# Patient Record
Sex: FEMALE | Race: WHITE | Employment: OTHER | URBAN - METROPOLITAN AREA
[De-identification: names, ages, dates, MRNs, and addresses within clinical notes are randomized per-mention and may not be internally consistent; named-entity substitution may affect disease eponyms.]

---

## 2019-02-13 ENCOUNTER — APPOINTMENT (OUTPATIENT)
Dept: CT IMAGING | Age: 79
DRG: 390 | End: 2019-02-13
Attending: EMERGENCY MEDICINE
Payer: MEDICARE

## 2019-02-13 ENCOUNTER — HOSPITAL ENCOUNTER (INPATIENT)
Age: 79
LOS: 3 days | Discharge: HOME | DRG: 390 | End: 2019-02-16
Attending: EMERGENCY MEDICINE | Admitting: SURGERY
Payer: MEDICARE

## 2019-02-13 DIAGNOSIS — K56.609 SBO (SMALL BOWEL OBSTRUCTION) (HCC): Primary | ICD-10-CM

## 2019-02-13 LAB
ALBUMIN SERPL-MCNC: 4.2 G/DL (ref 3.5–5)
ALBUMIN/GLOB SERPL: 1.3 {RATIO} (ref 1.1–2.2)
ALP SERPL-CCNC: 65 U/L (ref 45–117)
ALT SERPL-CCNC: 33 U/L (ref 12–78)
ANION GAP SERPL CALC-SCNC: 8 MMOL/L (ref 5–15)
APPEARANCE UR: CLEAR
AST SERPL-CCNC: 23 U/L (ref 15–37)
BACTERIA URNS QL MICRO: ABNORMAL /HPF
BASOPHILS # BLD: 0 K/UL (ref 0–0.1)
BASOPHILS NFR BLD: 0 % (ref 0–1)
BILIRUB SERPL-MCNC: 0.6 MG/DL (ref 0.2–1)
BILIRUB UR QL: NEGATIVE
BUN SERPL-MCNC: 22 MG/DL (ref 6–20)
BUN/CREAT SERPL: 19 (ref 12–20)
CALCIUM SERPL-MCNC: 9.1 MG/DL (ref 8.5–10.1)
CHLORIDE SERPL-SCNC: 102 MMOL/L (ref 97–108)
CO2 SERPL-SCNC: 29 MMOL/L (ref 21–32)
COLOR UR: ABNORMAL
COMMENT, HOLDF: NORMAL
CREAT SERPL-MCNC: 1.18 MG/DL (ref 0.55–1.02)
DIFFERENTIAL METHOD BLD: NORMAL
EOSINOPHIL # BLD: 0.2 K/UL (ref 0–0.4)
EOSINOPHIL NFR BLD: 2 % (ref 0–7)
EPITH CASTS URNS QL MICRO: ABNORMAL /LPF
ERYTHROCYTE [DISTWIDTH] IN BLOOD BY AUTOMATED COUNT: 13.2 % (ref 11.5–14.5)
GLOBULIN SER CALC-MCNC: 3.3 G/DL (ref 2–4)
GLUCOSE SERPL-MCNC: 115 MG/DL (ref 65–100)
GLUCOSE UR STRIP.AUTO-MCNC: NEGATIVE MG/DL
HCT VFR BLD AUTO: 45.1 % (ref 35–47)
HGB BLD-MCNC: 14.8 G/DL (ref 11.5–16)
HGB UR QL STRIP: NEGATIVE
KETONES UR QL STRIP.AUTO: NEGATIVE MG/DL
LEUKOCYTE ESTERASE UR QL STRIP.AUTO: NEGATIVE
LIPASE SERPL-CCNC: 133 U/L (ref 73–393)
LYMPHOCYTES # BLD: 2.4 K/UL (ref 0.8–3.5)
LYMPHOCYTES NFR BLD: 22 % (ref 12–49)
MCH RBC QN AUTO: 29.4 PG (ref 26–34)
MCHC RBC AUTO-ENTMCNC: 32.8 G/DL (ref 30–36.5)
MCV RBC AUTO: 89.7 FL (ref 80–99)
MONOCYTES # BLD: 0.8 K/UL (ref 0–1)
MONOCYTES NFR BLD: 7 % (ref 5–13)
NEUTS SEG # BLD: 7.4 K/UL (ref 1.8–8)
NEUTS SEG NFR BLD: 69 % (ref 32–75)
NITRITE UR QL STRIP.AUTO: NEGATIVE
PH UR STRIP: 6.5 [PH] (ref 5–8)
PLATELET # BLD AUTO: 220 K/UL (ref 150–400)
PMV BLD AUTO: 9.9 FL (ref 8.9–12.9)
POTASSIUM SERPL-SCNC: 4.1 MMOL/L (ref 3.5–5.1)
PROT SERPL-MCNC: 7.5 G/DL (ref 6.4–8.2)
PROT UR STRIP-MCNC: NEGATIVE MG/DL
RBC # BLD AUTO: 5.03 M/UL (ref 3.8–5.2)
RBC #/AREA URNS HPF: ABNORMAL /HPF (ref 0–5)
SAMPLES BEING HELD,HOLD: NORMAL
SODIUM SERPL-SCNC: 139 MMOL/L (ref 136–145)
SP GR UR REFRACTOMETRY: 1.02 (ref 1–1.03)
UA: UC IF INDICATED,UAUC: ABNORMAL
UROBILINOGEN UR QL STRIP.AUTO: 0.2 EU/DL (ref 0.2–1)
WBC # BLD AUTO: 10.8 K/UL (ref 3.6–11)
WBC URNS QL MICRO: ABNORMAL /HPF (ref 0–4)
XXWBCSUS: 0

## 2019-02-13 PROCEDURE — 74177 CT ABD & PELVIS W/CONTRAST: CPT

## 2019-02-13 PROCEDURE — 74011250636 HC RX REV CODE- 250/636: Performed by: EMERGENCY MEDICINE

## 2019-02-13 PROCEDURE — 81001 URINALYSIS AUTO W/SCOPE: CPT

## 2019-02-13 PROCEDURE — 96361 HYDRATE IV INFUSION ADD-ON: CPT

## 2019-02-13 PROCEDURE — 83690 ASSAY OF LIPASE: CPT

## 2019-02-13 PROCEDURE — 74011000258 HC RX REV CODE- 258: Performed by: SURGERY

## 2019-02-13 PROCEDURE — 74011250636 HC RX REV CODE- 250/636: Performed by: SURGERY

## 2019-02-13 PROCEDURE — 65270000029 HC RM PRIVATE

## 2019-02-13 PROCEDURE — 99285 EMERGENCY DEPT VISIT HI MDM: CPT

## 2019-02-13 PROCEDURE — 74011250637 HC RX REV CODE- 250/637: Performed by: SURGERY

## 2019-02-13 PROCEDURE — 96360 HYDRATION IV INFUSION INIT: CPT

## 2019-02-13 PROCEDURE — 74011636320 HC RX REV CODE- 636/320: Performed by: EMERGENCY MEDICINE

## 2019-02-13 PROCEDURE — 87086 URINE CULTURE/COLONY COUNT: CPT

## 2019-02-13 PROCEDURE — 36415 COLL VENOUS BLD VENIPUNCTURE: CPT

## 2019-02-13 PROCEDURE — 85025 COMPLETE CBC W/AUTO DIFF WBC: CPT

## 2019-02-13 PROCEDURE — 80053 COMPREHEN METABOLIC PANEL: CPT

## 2019-02-13 RX ORDER — SODIUM CHLORIDE, SODIUM LACTATE, POTASSIUM CHLORIDE, CALCIUM CHLORIDE 600; 310; 30; 20 MG/100ML; MG/100ML; MG/100ML; MG/100ML
125 INJECTION, SOLUTION INTRAVENOUS
Status: COMPLETED | OUTPATIENT
Start: 2019-02-13 | End: 2019-02-13

## 2019-02-13 RX ORDER — SODIUM CHLORIDE 0.9 % (FLUSH) 0.9 %
5-40 SYRINGE (ML) INJECTION AS NEEDED
Status: DISCONTINUED | OUTPATIENT
Start: 2019-02-13 | End: 2019-02-16 | Stop reason: HOSPADM

## 2019-02-13 RX ORDER — SODIUM CHLORIDE 0.9 % (FLUSH) 0.9 %
5-40 SYRINGE (ML) INJECTION EVERY 8 HOURS
Status: DISCONTINUED | OUTPATIENT
Start: 2019-02-13 | End: 2019-02-16 | Stop reason: HOSPADM

## 2019-02-13 RX ORDER — MORPHINE SULFATE 2 MG/ML
2 INJECTION, SOLUTION INTRAMUSCULAR; INTRAVENOUS
Status: DISCONTINUED | OUTPATIENT
Start: 2019-02-13 | End: 2019-02-13

## 2019-02-13 RX ORDER — DEXTROSE MONOHYDRATE AND SODIUM CHLORIDE 5; .45 G/100ML; G/100ML
125 INJECTION, SOLUTION INTRAVENOUS CONTINUOUS
Status: DISCONTINUED | OUTPATIENT
Start: 2019-02-13 | End: 2019-02-16 | Stop reason: HOSPADM

## 2019-02-13 RX ORDER — PANTOPRAZOLE SODIUM 40 MG/1
40 TABLET, DELAYED RELEASE ORAL
Status: DISCONTINUED | OUTPATIENT
Start: 2019-02-14 | End: 2019-02-16 | Stop reason: HOSPADM

## 2019-02-13 RX ORDER — ONDANSETRON 2 MG/ML
4 INJECTION INTRAMUSCULAR; INTRAVENOUS
Status: DISCONTINUED | OUTPATIENT
Start: 2019-02-13 | End: 2019-02-16 | Stop reason: HOSPADM

## 2019-02-13 RX ORDER — BUPROPION HYDROCHLORIDE 150 MG/1
150 TABLET ORAL DAILY
Status: DISCONTINUED | OUTPATIENT
Start: 2019-02-14 | End: 2019-02-16 | Stop reason: HOSPADM

## 2019-02-13 RX ORDER — SERTRALINE HYDROCHLORIDE 50 MG/1
100 TABLET, FILM COATED ORAL DAILY
Status: DISCONTINUED | OUTPATIENT
Start: 2019-02-14 | End: 2019-02-16 | Stop reason: HOSPADM

## 2019-02-13 RX ORDER — ENOXAPARIN SODIUM 100 MG/ML
40 INJECTION SUBCUTANEOUS EVERY 24 HOURS
Status: DISCONTINUED | OUTPATIENT
Start: 2019-02-13 | End: 2019-02-16 | Stop reason: HOSPADM

## 2019-02-13 RX ORDER — MORPHINE SULFATE 4 MG/ML
2 INJECTION INTRAVENOUS
Status: DISCONTINUED | OUTPATIENT
Start: 2019-02-13 | End: 2019-02-16 | Stop reason: HOSPADM

## 2019-02-13 RX ORDER — PRAVASTATIN SODIUM 20 MG/1
40 TABLET ORAL
Status: DISCONTINUED | OUTPATIENT
Start: 2019-02-13 | End: 2019-02-16 | Stop reason: HOSPADM

## 2019-02-13 RX ADMIN — SODIUM CHLORIDE, SODIUM LACTATE, POTASSIUM CHLORIDE, AND CALCIUM CHLORIDE 125 ML/HR: 600; 310; 30; 20 INJECTION, SOLUTION INTRAVENOUS at 15:59

## 2019-02-13 RX ADMIN — PRAVASTATIN SODIUM 40 MG: 20 TABLET ORAL at 21:26

## 2019-02-13 RX ADMIN — SODIUM CHLORIDE 1000 ML: 900 INJECTION, SOLUTION INTRAVENOUS at 12:19

## 2019-02-13 RX ADMIN — IOPAMIDOL 100 ML: 755 INJECTION, SOLUTION INTRAVENOUS at 12:39

## 2019-02-13 RX ADMIN — DEXTROSE MONOHYDRATE AND SODIUM CHLORIDE 125 ML/HR: 5; .45 INJECTION, SOLUTION INTRAVENOUS at 18:02

## 2019-02-13 RX ADMIN — ENOXAPARIN SODIUM 40 MG: 40 INJECTION SUBCUTANEOUS at 15:52

## 2019-02-13 NOTE — ED NOTES
Timeout completed with LOLA PRATER signed by all parties and original sent with patient to [facility]. Pt sent with IVF infusing without difficulty. VS stable. Call Floretta Kayser

## 2019-02-13 NOTE — ED TRIAGE NOTES
Pt ambulatory to treatment area with c/o \"intermittent upper abdominal pain that started this morning around 0500. I have had diverticulitis before but this feels different. \"  Pt denies fevers, nausea, vomiting, diarrhea, urinary symptoms.

## 2019-02-13 NOTE — ED NOTES
No D51/2NS available at [facility]. Dr. Sean Cha notified. Received VO for LR @125ml/hr until pt gets to [facility] and then switch to D51/2NS.

## 2019-02-13 NOTE — H&P
Surgery History and Physical 
 
Subjective:  
Patient 69 y.o.  female presents with abdominal pain. Onset of symptoms was this morning after eating a piece of bread. She reports constant LLQ pain since that time. She denies any fevers of chills. She reports that hr last BM was Friday. She has been noticing a decrease in caliber of her stools recently. She has had a history of diverticulitis in the past and has a known sigmoid stricture. She reports that her pain is not the same as her pain related to diverticulitis. She reports feeling very constipated. She reports that she has not had a very good diet recently. She denies any nausea or vomiting. Past Medical & Surgical History: 
Past Medical History:  
Diagnosis Date  Depression  Diverticulitis  GERD (gastroesophageal reflux disease)  Hiatal hernia  High cholesterol  Sleep apnea   
 uses CPAP Past Surgical History:  
Procedure Laterality Date  ABDOMEN SURGERY PROC UNLISTED    
 exploratory surgery  HX CATARACT REMOVAL    
 bilateral cataract extraction  HX COLONOSCOPY  12/18/2014  HX ENDOSCOPY  12/18/2014  HX ORTHOPAEDIC    
 right ankle surgery  HX ORTHOPAEDIC    
 left arm surgery  HX MARCIE AND BSO  HX TONSILLECTOMY Social History: 
Social History Socioeconomic History  Marital status:  Spouse name: Not on file  Number of children: Not on file  Years of education: Not on file  Highest education level: Not on file Social Needs  Financial resource strain: Not on file  Food insecurity - worry: Not on file  Food insecurity - inability: Not on file  Transportation needs - medical: Not on file  Transportation needs - non-medical: Not on file Occupational History  Not on file Tobacco Use  Smoking status: Never Smoker  Smokeless tobacco: Never Used Substance and Sexual Activity  Alcohol use: No  
 Drug use:  No  
  Sexual activity: Not on file Other Topics Concern  Not on file Social History Narrative  Not on file Family History: 
Family History Problem Relation Age of Onset  Heart Disease Mother  Heart Disease Father  Diabetes Father  Stroke Father  Diabetes Brother  Hypertension Brother  No Known Problems Son  No Known Problems Son   
  
 
Prior to Admission Medications: 
Prior to Admission Medications Prescriptions Last Dose Informant Patient Reported? Taking? BIFIDOBACTERIUM INFANTIS (ALIGN PO)   Yes No  
Sig: Take  by mouth. CALCIUM CARBONATE (CORAL CALCIUM PO)   Yes No  
Sig: Take  by mouth. Cholecalciferol, Vitamin D3, (VITAMIN D3) 2,000 unit cap capsule   Yes No  
Sig: Take  by mouth daily. HERBAL DRUGS   Yes No  
Sig: by Does Not Apply route. HYDROcodone-acetaminophen (NORCO) 5-325 mg per tablet   No No  
Sig: Take 1 Tab by mouth every six (6) hours as needed for Pain. Max Daily Amount: 4 Tabs. MULTIVIT &MINERALS/FERROUS FUM (MULTI VITAMIN PO)   Yes No  
Sig: Take  by mouth. PSYLLIUM SEED, WITH DEXTROSE, (FIBER PO)   Yes No  
Sig: Take  by mouth. acetaminophen (TYLENOL EXTRA STRENGTH) 500 mg tablet   Yes No  
Sig: Take 1,000 mg by mouth every six (6) hours as needed for Pain. aspirin 81 mg tablet   Yes No  
Sig: Take 81 mg by mouth. buPROPion XL (WELLBUTRIN XL) 150 mg tablet   Yes No  
Sig: Take 150 mg by mouth every morning. omeprazole (PRILOSEC) 20 mg capsule   Yes No  
Sig: Take 20 mg by mouth daily as needed. pravastatin (PRAVACHOL) 40 mg tablet   Yes No  
Sig: Take 40 mg by mouth nightly. sertraline (ZOLOFT) 50 mg tablet   Yes No  
Sig: Take 25 mg by mouth daily. Facility-Administered Medications: None Allergies: Allergies Allergen Reactions  Lipitor [Atorvastatin] Myalgia Review of Systems A comprehensive review of systems was negative except for that written in the HPI. Objective:  
 
Exam: Visit Vitals /55 Pulse 69 Temp 98 °F (36.7 °C) Resp 19 Ht 5' (1.524 m) Wt 73.1 kg (161 lb 2.5 oz) SpO2 93% BMI 31.47 kg/m² General:  Alert, cooperative, no distress, appears stated age. Head:  Normocephalic, without obvious abnormality, atraumatic. Eyes:  Conjunctivae/corneas clear. PERRL, EOMs intact. Ears:  Normal external ear canals both ears. Nose: Nares normal. Septum midline. Mucosa normal.  
Throat: Lips, mucosa, and tongue normal. Teeth and gums normal.  
Neck: Supple, symmetrical, trachea midline. Back:   Symmetric, no curvature. ROM normal. No CVA tenderness. Lungs:   Clear to auscultation bilaterally. Chest wall:  No tenderness or deformity. Heart:  Regular rate and rhythm Abdomen:   Soft, tender LLQ. No masses,  No organomegaly. No rebound or gaurding Extremities: Extremities normal, atraumatic, no cyanosis or edema. Pulses: 2+ and symmetric all extremities. Skin: Skin color, texture, turgor normal. No rashes or lesions. Neurologic: CNII-XII intact. Normal strength, sensation and reflexes throughout. Data Review Recent Results (from the past 24 hour(s)) SAMPLES BEING HELD Collection Time: 02/13/19 11:21 AM  
Result Value Ref Range SAMPLES BEING HELD STAT    
 COMMENT Add-on orders for these samples will be processed based on acceptable specimen integrity and analyte stability, which may vary by analyte. METABOLIC PANEL, COMPREHENSIVE Collection Time: 02/13/19 11:21 AM  
Result Value Ref Range Sodium 139 136 - 145 mmol/L Potassium 4.1 3.5 - 5.1 mmol/L Chloride 102 97 - 108 mmol/L  
 CO2 29 21 - 32 mmol/L Anion gap 8 5 - 15 mmol/L Glucose 115 (H) 65 - 100 mg/dL BUN 22 (H) 6 - 20 MG/DL Creatinine 1.18 (H) 0.55 - 1.02 MG/DL  
 BUN/Creatinine ratio 19 12 - 20 GFR est AA 54 (L) >60 ml/min/1.73m2 GFR est non-AA 44 (L) >60 ml/min/1.73m2  Calcium 9.1 8.5 - 10.1 MG/DL  
 Bilirubin, total 0.6 0.2 - 1.0 MG/DL  
 ALT (SGPT) 33 12 - 78 U/L  
 AST (SGOT) 23 15 - 37 U/L Alk. phosphatase 65 45 - 117 U/L Protein, total 7.5 6.4 - 8.2 g/dL Albumin 4.2 3.5 - 5.0 g/dL Globulin 3.3 2.0 - 4.0 g/dL A-G Ratio 1.3 1.1 - 2.2 LIPASE Collection Time: 02/13/19 11:21 AM  
Result Value Ref Range Lipase 133 73 - 393 U/L  
CBC WITH AUTOMATED DIFF Collection Time: 02/13/19 11:21 AM  
Result Value Ref Range WBC 10.8 3.6 - 11.0 K/uL  
 RBC 5.03 3.80 - 5.20 M/uL  
 HGB 14.8 11.5 - 16.0 g/dL HCT 45.1 35.0 - 47.0 % MCV 89.7 80.0 - 99.0 FL  
 MCH 29.4 26.0 - 34.0 PG  
 MCHC 32.8 30.0 - 36.5 g/dL  
 RDW 13.2 11.5 - 14.5 % PLATELET 021 657 - 050 K/uL MPV 9.9 8.9 - 12.9 FL  
 NEUTROPHILS 69 32 - 75 % LYMPHOCYTES 22 12 - 49 % MONOCYTES 7 5 - 13 % EOSINOPHILS 2 0 - 7 % BASOPHILS 0 0 - 1 %  
 ABS. NEUTROPHILS 7.4 1.8 - 8.0 K/UL  
 ABS. LYMPHOCYTES 2.4 0.8 - 3.5 K/UL  
 ABS. MONOCYTES 0.8 0.0 - 1.0 K/UL  
 ABS. EOSINOPHILS 0.2 0.0 - 0.4 K/UL  
 ABS. BASOPHILS 0.0 0.0 - 0.1 K/UL  
 DF AUTOMATED XXWBCSUS 0    
URINALYSIS W/ REFLEX CULTURE Collection Time: 02/13/19 11:21 AM  
Result Value Ref Range Color YELLOW/STRAW Appearance CLEAR CLEAR Specific gravity 1.020 1.003 - 1.030    
 pH (UA) 6.5 5.0 - 8.0 Protein NEGATIVE  NEG mg/dL Glucose NEGATIVE  NEG mg/dL Ketone NEGATIVE  NEG mg/dL Bilirubin NEGATIVE  NEG Blood NEGATIVE  NEG Urobilinogen 0.2 0.2 - 1.0 EU/dL Nitrites NEGATIVE  NEG Leukocyte Esterase NEGATIVE  NEG    
 WBC 0-4 0 - 4 /hpf  
 RBC 0-5 0 - 5 /hpf Epithelial cells FEW FEW /lpf Bacteria 1+ (A) NEG /hpf  
 UA:UC IF INDICATED URINE CULTURE ORDERED (A) CNI Assessment:  
 
Active Problems: 
  Small bowel obstruction ([facility] [address] (2/13/2019) Plan:  
 
Admit to floor for bowel rest and hydration Soap suds enema tonight IVF IV pain medication as needed Will consider GI consult for known sigmoid stricture - last colonoscopy 4-5 yrs ago

## 2019-02-13 NOTE — ED NOTES
TRANSFER - OUT REPORT: 
 
Verbal report given to Sha Jiménez RN(name) on Adalid Sen  being transferred to [facility] room 429(unit) for routine progression of care Report consisted of patients Situation, Background, Assessment and  
Recommendations(SBAR). Information from the following report(s) SBAR, Kardex, ED Summary, [facility] and Recent Results was reviewed with the receiving nurse. Lines:  
Peripheral IV 02/13/19 Antecubital (Active) Site Assessment Clean, dry, & intact 2/13/2019 11:22 AM  
Phlebitis Assessment 0 2/13/2019 11:22 AM  
Infiltration Assessment 0 2/13/2019 11:22 AM  
Dressing Status Clean, dry, & intact 2/13/2019 11:22 AM  
Dressing Type Transparent 2/13/2019 11:22 AM  
Hub Color/Line Status Pink 2/13/2019 11:22 AM  
  
 
Opportunity for questions and clarification was provided. Patient transported with: 
 Monitor, personal belongings.

## 2019-02-13 NOTE — ED NOTES
[facility] has not arrived to transport patient. Unit sec to call and get ETA. Pt aware and resting on stretcher in no distress. No additional needs at this time.

## 2019-02-13 NOTE — ED PROVIDER NOTES
HPI  
The patient is a 60-year-old white female who presents to the emergency room with acute onset umbilical abdominal pain since 5 AM when it woke her up from sleep. She's had decreased flatus and decreased bowel movements associated with it. She has a history of a narrowing of her sigmoid colon. She also has recurrent episodes of diverticulitis but this does not feel like that according to her. She had a history of exploratory laparotomy for trauma and 87. She still has her appendix and her gallbladder. Past Medical History:  
Diagnosis Date  Depression  Diverticulitis  GERD (gastroesophageal reflux disease)  Hiatal hernia  High cholesterol  Sleep apnea   
 uses CPAP Past Surgical History:  
Procedure Laterality Date  ABDOMEN SURGERY PROC UNLISTED    
 exploratory surgery  HX CATARACT REMOVAL    
 bilateral cataract extraction  HX COLONOSCOPY  12/18/2014  HX ENDOSCOPY  12/18/2014  HX ORTHOPAEDIC    
 right ankle surgery  HX ORTHOPAEDIC    
 left arm surgery  HX MARCIE AND BSO  HX TONSILLECTOMY Family History:  
Problem Relation Age of Onset  Heart Disease Mother  Heart Disease Father  Diabetes Father  Stroke Father  Diabetes Brother  Hypertension Brother  No Known Problems Son  No Known Problems Son   
 
 
Social History Socioeconomic History  Marital status:  Spouse name: Not on file  Number of children: Not on file  Years of education: Not on file  Highest education level: Not on file Social Needs  Financial resource strain: Not on file  Food insecurity - worry: Not on file  Food insecurity - inability: Not on file  Transportation needs - medical: Not on file  Transportation needs - non-medical: Not on file Occupational History  Not on file Tobacco Use  Smoking status: Never Smoker  Smokeless tobacco: Never Used Substance and Sexual Activity  Alcohol use: No  
 Drug use: No  
 Sexual activity: Not on file Other Topics Concern  Not on file Social History Narrative  Not on file ALLERGIES: Lipitor [atorvastatin] Review of Systems All other systems reviewed and are negative. Vitals:  
 02/13/19 1111 BP: 165/75 Pulse: 69 Resp: 19 Temp: 98 °F (36.7 °C) SpO2: 94% Weight: 73.1 kg (161 lb 2.5 oz) Height: 5' (1.524 m) Physical Exam  
Constitutional: She is oriented to person, place, and time. She appears well-developed and well-nourished. HENT:  
Head: Normocephalic and atraumatic. Mouth/Throat: Oropharynx is clear and moist. No oropharyngeal exudate. Eyes: Conjunctivae are normal. No scleral icterus. Neck: Neck supple. No thyromegaly present. Cardiovascular: Normal rate, regular rhythm and normal heart sounds. Exam reveals no gallop and no friction rub. No murmur heard. Pulmonary/Chest: Effort normal and breath sounds normal. No stridor. No respiratory distress. She has no wheezes. She has no rales. Abdominal: Soft. Bowel sounds are normal. There is no tenderness. There is no rebound and no guarding. Diffuse tenderness no guarding or rebound Musculoskeletal: Normal range of motion. Lymphadenopathy:  
  She has no cervical adenopathy. Neurological: She is alert and oriented to person, place, and time. Skin: Skin is warm and dry. Psychiatric: She has a normal mood and affect. Nursing note and vitals reviewed. MDM Number of Diagnoses or Management Options SBO (small bowel obstruction) ([facility] [address]): Amount and/or Complexity of Data Reviewed Clinical lab tests: ordered and reviewed Tests in the radiology section of CPT®: ordered and reviewed Tests in the medicine section of CPT®: ordered and reviewed Discussion of test results with the performing providers: yes Obtain history from someone other than the patient: yes Discuss the patient with other providers: yes Procedures Assessment and plan     the patient has been given IV fluids consultation with Dr. Camille Horowitz was obtained who agreed to accept her for direct admission to a surgical bed. Total critical care time spent exclusive of procedures: 36  minutes

## 2019-02-14 LAB
ANION GAP SERPL CALC-SCNC: 11 MMOL/L (ref 5–15)
BUN SERPL-MCNC: 14 MG/DL (ref 6–20)
BUN/CREAT SERPL: 15 (ref 12–20)
CALCIUM SERPL-MCNC: 8.5 MG/DL (ref 8.5–10.1)
CHLORIDE SERPL-SCNC: 106 MMOL/L (ref 97–108)
CO2 SERPL-SCNC: 23 MMOL/L (ref 21–32)
CREAT SERPL-MCNC: 0.95 MG/DL (ref 0.55–1.02)
ERYTHROCYTE [DISTWIDTH] IN BLOOD BY AUTOMATED COUNT: 12.6 % (ref 11.5–14.5)
GLUCOSE SERPL-MCNC: 110 MG/DL (ref 65–100)
HCT VFR BLD AUTO: 38.9 % (ref 35–47)
HGB BLD-MCNC: 12.4 G/DL (ref 11.5–16)
MCH RBC QN AUTO: 28.7 PG (ref 26–34)
MCHC RBC AUTO-ENTMCNC: 31.9 G/DL (ref 30–36.5)
MCV RBC AUTO: 90 FL (ref 80–99)
NRBC # BLD: 0 K/UL (ref 0–0.01)
NRBC BLD-RTO: 0 PER 100 WBC
PLATELET # BLD AUTO: 189 K/UL (ref 150–400)
PMV BLD AUTO: 10.2 FL (ref 8.9–12.9)
POTASSIUM SERPL-SCNC: 3.6 MMOL/L (ref 3.5–5.1)
RBC # BLD AUTO: 4.32 M/UL (ref 3.8–5.2)
SODIUM SERPL-SCNC: 140 MMOL/L (ref 136–145)
WBC # BLD AUTO: 10.1 K/UL (ref 3.6–11)

## 2019-02-14 PROCEDURE — 74011250637 HC RX REV CODE- 250/637: Performed by: SURGERY

## 2019-02-14 PROCEDURE — 74011000258 HC RX REV CODE- 258: Performed by: SURGERY

## 2019-02-14 PROCEDURE — 65270000029 HC RM PRIVATE

## 2019-02-14 PROCEDURE — 85027 COMPLETE CBC AUTOMATED: CPT

## 2019-02-14 PROCEDURE — 80048 BASIC METABOLIC PNL TOTAL CA: CPT

## 2019-02-14 PROCEDURE — 74011250636 HC RX REV CODE- 250/636: Performed by: SURGERY

## 2019-02-14 PROCEDURE — 36415 COLL VENOUS BLD VENIPUNCTURE: CPT

## 2019-02-14 RX ORDER — DOCUSATE SODIUM 100 MG/1
100 CAPSULE, LIQUID FILLED ORAL 2 TIMES DAILY
Status: DISCONTINUED | OUTPATIENT
Start: 2019-02-14 | End: 2019-02-16 | Stop reason: HOSPADM

## 2019-02-14 RX ORDER — POLYETHYLENE GLYCOL 3350 17 G/17G
17 POWDER, FOR SOLUTION ORAL DAILY
Status: DISCONTINUED | OUTPATIENT
Start: 2019-02-15 | End: 2019-02-16 | Stop reason: HOSPADM

## 2019-02-14 RX ADMIN — PRAVASTATIN SODIUM 40 MG: 20 TABLET ORAL at 21:27

## 2019-02-14 RX ADMIN — ENOXAPARIN SODIUM 40 MG: 40 INJECTION SUBCUTANEOUS at 16:27

## 2019-02-14 RX ADMIN — DOCUSATE SODIUM 100 MG: 100 CAPSULE, LIQUID FILLED ORAL at 17:57

## 2019-02-14 RX ADMIN — DEXTROSE MONOHYDRATE AND SODIUM CHLORIDE 125 ML/HR: 5; .45 INJECTION, SOLUTION INTRAVENOUS at 19:29

## 2019-02-14 RX ADMIN — PANTOPRAZOLE SODIUM 40 MG: 40 TABLET, DELAYED RELEASE ORAL at 06:37

## 2019-02-14 RX ADMIN — Medication 10 ML: at 16:27

## 2019-02-14 RX ADMIN — BUPROPION HYDROCHLORIDE 150 MG: 150 TABLET, FILM COATED, EXTENDED RELEASE ORAL at 09:47

## 2019-02-14 RX ADMIN — Medication 10 ML: at 21:27

## 2019-02-14 RX ADMIN — DEXTROSE MONOHYDRATE AND SODIUM CHLORIDE 125 ML/HR: 5; .45 INJECTION, SOLUTION INTRAVENOUS at 09:49

## 2019-02-14 RX ADMIN — SERTRALINE HYDROCHLORIDE 100 MG: 50 TABLET ORAL at 09:46

## 2019-02-14 NOTE — PROGRESS NOTES
SURGERY PROGRESS NOTE Admit Date: 2019 POD * No surgery found * Procedure: * No surgery found * Subjective:  
Feels better today Large BM with enema last pm 
 
 
Objective:  
 
Visit Vitals /69 (BP 1 Location: Left arm, BP Patient Position: At rest) Pulse (!) 57 Temp 97.9 °F (36.6 °C) Resp 18 Ht 5' (1.524 m) Wt 73.1 kg (161 lb 2.5 oz) SpO2 96% BMI 31.47 kg/m² Temp (24hrs), Av.3 °F (36.8 °C), Min:97.8 °F (36.6 °C), Max:99.1 °F (37.3 °C) Physical Exam: Abdomen:  Soft. Non-tender, non-distended Lab Results Component Value Date/Time WBC 10.1 2019 04:17 AM  
 HGB 12.4 2019 04:17 AM  
 HCT 38.9 2019 04:17 AM  
 PLATELET 485  04:17 AM  
 MCV 90.0 2019 04:17 AM  
 
Lab Results Component Value Date/Time GFR est non-AA 57 (L) 2019 04:17 AM  
 GFR est AA >60 2019 04:17 AM  
 Creatinine 0.95 2019 04:17 AM  
 BUN 14 2019 04:17 AM  
 Sodium 140 2019 04:17 AM  
 Potassium 3.6 2019 04:17 AM  
 Chloride 106 2019 04:17 AM  
 CO2 23 2019 04:17 AM  
 Magnesium 2.4 10/05/2012 08:20 PM  
 
 
Assessment:  
 
Active Problems: 
  Small bowel obstruction ([facility] [address] (2019) Plan/Recommendations/Medical Decision Making:  
Clears as tolerates Bowel regimen for constipation OOB ambulate

## 2019-02-14 NOTE — PROGRESS NOTES
Bedside shift change report given to Shadia [address] (oncoming nurse) by Aravind Roajs (offgoing nurse).  Report included the following information SBAR, Kardex, Recent Results and Med Rec Status.  
'

## 2019-02-14 NOTE — PROGRESS NOTES
GI note Last colonoscopy 2014 by Dr. Santos Axon- no mention of colon stricture at all.   
--------------------- 
IMPRESSION: 
External and internal hemorrhoids Diverticulosis of the sigmoid colon. ----------------- She had Barium Enema at [facility] 2014 - \"No evidence for abnormal narrowing or stricture\". She had CT at [address] [place] [address] 8/2018 with no mention of colon stricture or colonic dilation or diverticulitis. CT here shows small bowel obstruction. Thanks for consultation, I will see and write a note but I have no plans for immediate colonoscopy or flex sig. If you have alternate information about a sigmoid stricture please help me to find that thanks.  
 
 
Gianna Salas MD

## 2019-02-14 NOTE — PROGRESS NOTES
Problem: Falls - Risk of 
Goal: *Absence of Falls Document Leticia Wise Fall Risk and appropriate interventions in the flowsheet. Outcome: Progressing Towards Goal 
Fall Risk Interventions:

## 2019-02-14 NOTE — PROGRESS NOTES
Bedside and Verbal shift change report given to Robert Lopez RN (oncoming nurse) by Reji Flores RN (offgoing nurse). Report included the following information SBAR, Kardex, ED Summary, Intake/Output, MAR, Accordion and Recent Results.

## 2019-02-14 NOTE — PROGRESS NOTES
2/14/2019 10:13 AM Met with pt. Charted address and phone numbers confirmed. Reason for Admission: Abdominal pain, emergency admit RRAT Score: 9 Plan for utilizing home health: tbd, no history Likelihood of Readmission: low Transition of Care Plan: home with family and outpatient follow up Pt lives with her  in a 1 story home in [place], there are 5 steps to enter. Does not own any dme Has rx coverage and fills scripts at [place] [facility]) Independent with adls and driving prior to admission Pt's  or son will transport pt home. No discharge needs identified at this time. CM will follow. MER[facility] Care Management Interventions PCP Verified by CM: Alexa Loco, no nurse navigator) MyChart Signup: No 
Discharge Durable Medical Equipment: No 
Physical Therapy Consult: No 
Occupational Therapy Consult: No 
Speech Therapy Consult: No 
Current Support Network: Lives with Spouse, Own Home

## 2019-02-15 ENCOUNTER — APPOINTMENT (OUTPATIENT)
Dept: GENERAL RADIOLOGY | Age: 79
DRG: 390 | End: 2019-02-15
Attending: SPECIALIST
Payer: MEDICARE

## 2019-02-15 LAB
BACTERIA SPEC CULT: NORMAL
CC UR VC: NORMAL
SERVICE CMNT-IMP: NORMAL

## 2019-02-15 PROCEDURE — 65270000029 HC RM PRIVATE

## 2019-02-15 PROCEDURE — 74011000258 HC RX REV CODE- 258: Performed by: SURGERY

## 2019-02-15 PROCEDURE — 74011250637 HC RX REV CODE- 250/637: Performed by: INTERNAL MEDICINE

## 2019-02-15 PROCEDURE — 74018 RADEX ABDOMEN 1 VIEW: CPT

## 2019-02-15 PROCEDURE — 74011000272 HC RX REV CODE- 272: Performed by: INTERNAL MEDICINE

## 2019-02-15 PROCEDURE — 74011250637 HC RX REV CODE- 250/637: Performed by: SURGERY

## 2019-02-15 PROCEDURE — 74011250636 HC RX REV CODE- 250/636: Performed by: SURGERY

## 2019-02-15 RX ADMIN — Medication 10 ML: at 22:50

## 2019-02-15 RX ADMIN — SERTRALINE HYDROCHLORIDE 100 MG: 50 TABLET ORAL at 10:19

## 2019-02-15 RX ADMIN — ENOXAPARIN SODIUM 40 MG: 40 INJECTION SUBCUTANEOUS at 15:44

## 2019-02-15 RX ADMIN — DOCUSATE SODIUM 100 MG: 100 CAPSULE, LIQUID FILLED ORAL at 17:19

## 2019-02-15 RX ADMIN — PANTOPRAZOLE SODIUM 40 MG: 40 TABLET, DELAYED RELEASE ORAL at 06:41

## 2019-02-15 RX ADMIN — BUPROPION HYDROCHLORIDE 150 MG: 150 TABLET, FILM COATED, EXTENDED RELEASE ORAL at 10:19

## 2019-02-15 RX ADMIN — DEXTROSE MONOHYDRATE AND SODIUM CHLORIDE 125 ML/HR: 5; .45 INJECTION, SOLUTION INTRAVENOUS at 20:41

## 2019-02-15 RX ADMIN — PRAVASTATIN SODIUM 40 MG: 20 TABLET ORAL at 21:23

## 2019-02-15 RX ADMIN — POLYETHYLENE GLYCOL 3350 17 G: 17 POWDER, FOR SOLUTION ORAL at 10:19

## 2019-02-15 RX ADMIN — LACTULOSE 500 ML: 10 SOLUTION ORAL at 15:10

## 2019-02-15 RX ADMIN — LACTULOSE 500 ML: 10 SOLUTION ORAL at 17:19

## 2019-02-15 RX ADMIN — Medication 10 ML: at 15:10

## 2019-02-15 RX ADMIN — DEXTROSE MONOHYDRATE AND SODIUM CHLORIDE 75 ML/HR: 5; .45 INJECTION, SOLUTION INTRAVENOUS at 06:41

## 2019-02-15 RX ADMIN — DOCUSATE SODIUM 100 MG: 100 CAPSULE, LIQUID FILLED ORAL at 10:19

## 2019-02-15 NOTE — CONSULTS
Gastroenterology Consultation Note      Admit Date: 2/13/2019  Consult Date: 2/15/2019   I greatly appreciate your asking me to see Radha Turner, thank you very much for the opportunity to participate in her care. Narrative Assessment and Plan   · Small bowel obstruction  · LLQ pain    Plan  - continue with conservative management as patient seems to be responding (surgery managing)  - repeat KUB ordered for follow up   - continue daily bowel regimen  - no plans for endoscopic testing     -------------------------  Julián Marley. Bessie Christiansen MD  [phone] office  [phone] voicemail   I have personally reviewed the history and independently examined the patient. I have reviewed the chart and agree with the documentation recorded by the Mid Level Provider, including the assessment, treatment plan, and disposition. ASSESSMENT AND PLAN:  She advises having been told of a sigmoid stricture in [id number]. I reviewed with her the plethora of studies in last 5 years without confirmation of such. I think she had severe constipation or a small bowel obstruction. She reports ongoing pain but exam negative for peritonitic signs. -repeat enema  -clears  -observation  -ADAT  No plans for, nor indications for, endoscopic intervention at this time. Following. Christa South MD        Subjective:     Chief Complaint: abdominal pain    History of Present Illness: Radha Turner is a 69 y.o. female who presents with complaints of abdominal pain. She states that she was in her usual state of health until 2 days ago. She woke up feeling pain across her abdomen. Progressively worsening throughout the day. Denies associated fever, chills, nausea or vomiting.  Does admit that she noticed she was not having bowel movements like usual. Patient states that since caring for her  she had not been paying attention to her daily bowel pattern as she has in the past. She has a history of constipation and chronic LLQ but with OTC bowel regimen her symptoms resolve. She tells me that \"her colon is narrowed\" and that was diagnosed many years ago (prior to last colonoscopy). In ED: CT scan findings consistent with small bowel obstruction  This AM patient states that she is feeling better. She got relief after soap suds enema. Mild LLQ pain. Denies nausea or vomiting. Passing small amount of flatus. Per review of office records  - 2016 barium enema: redundant sigmoid colon, mild diverticulosis, no narrowing or stricture  - 2014 colonoscopy: external and internal hemorrhoids, diverticulosis, no stricture    PCP:  Dm Man MD    Past Medical History:   Diagnosis Date    Depression     Diverticulitis     GERD (gastroesophageal reflux disease)     Hiatal hernia     High cholesterol     Sleep apnea     uses CPAP        Past Surgical History:   Procedure Laterality Date    ABDOMEN SURGERY PROC UNLISTED      exploratory surgery    HX CATARACT REMOVAL      bilateral cataract extraction    HX COLONOSCOPY  12/18/2014    HX ENDOSCOPY  12/18/2014    HX ORTHOPAEDIC      right ankle surgery    HX ORTHOPAEDIC      left arm surgery    HX MARCIE AND BSO      HX TONSILLECTOMY         Social History     Tobacco Use    Smoking status: Never Smoker    Smokeless tobacco: Never Used   Substance Use Topics    Alcohol use: No        Family History   Problem Relation Age of Onset    Heart Disease Mother     Heart Disease Father     Diabetes Father     Stroke Father     Diabetes Brother     Hypertension Brother     No Known Problems Son     No Known Problems Son         Allergies   Allergen Reactions    Lipitor [Atorvastatin] Myalgia            Home Medications:  Prior to Admission Medications   Prescriptions Last Dose Informant Patient Reported? Taking? BIFIDOBACTERIUM INFANTIS (ALIGN PO) 2/13/2019 at 0630  Yes Yes   Sig: Take 1 Tab by mouth daily.    CALCIUM CARBONATE (CORAL CALCIUM PO) 2/13/2019 at 0630  Yes Yes   Sig: Take 1 Tab by mouth daily. Cholecalciferol, Vitamin D3, (VITAMIN D3) 2,000 unit cap capsule 2/13/2019 at 0630  Yes Yes   Sig: Take  by mouth daily. MULTIVIT &MINERALS/FERROUS FUM (MULTI VITAMIN PO) 2/13/2019 at 0630  Yes Yes   Sig: Take 1 Tab by mouth daily. PSYLLIUM SEED, WITH DEXTROSE, (FIBER PO) 2/13/2019 at 0630  Yes Yes   Sig: Take 1 Tab by mouth daily. acetaminophen (TYLENOL EXTRA STRENGTH) 500 mg tablet 2/10/2019 at 1200  Yes Yes   Sig: Take 1,000 mg by mouth every six (6) hours as needed for Pain. aspirin 81 mg tablet 2/12/2019 at 2130  Yes No   Sig: Take 81 mg by mouth daily. buPROPion XL (WELLBUTRIN XL) 150 mg tablet 2/13/2019 at 0630  Yes Yes   Sig: Take 150 mg by mouth every morning. pravastatin (PRAVACHOL) 40 mg tablet 2/12/2019 at 2130  Yes Yes   Sig: Take 40 mg by mouth nightly. sertraline (ZOLOFT) 50 mg tablet   Yes Yes   Sig: Take 100 mg by mouth daily.       Facility-Administered Medications: None       Hospital Medications:  Current Facility-Administered Medications   Medication Dose Route Frequency    docusate sodium (COLACE) capsule 100 mg  100 mg Oral BID    polyethylene glycol (MIRALAX) packet 17 g  17 g Oral DAILY    sodium chloride (NS) flush 5-40 mL  5-40 mL IntraVENous Q8H    sodium chloride (NS) flush 5-40 mL  5-40 mL IntraVENous PRN    dextrose 5 % - 0.45% NaCl infusion  125 mL/hr IntraVENous CONTINUOUS    ondansetron (ZOFRAN) injection 4 mg  4 mg IntraVENous Q4H PRN    enoxaparin (LOVENOX) injection 40 mg  40 mg SubCUTAneous Q24H    buPROPion XL (WELLBUTRIN XL) tablet 150 mg  150 mg Oral DAILY    pantoprazole (PROTONIX) tablet 40 mg  40 mg Oral ACB    sertraline (ZOLOFT) tablet 100 mg  100 mg Oral DAILY    pravastatin (PRAVACHOL) tablet 40 mg  40 mg Oral QHS    morphine injection 2 mg  2 mg IntraVENous Q4H PRN       Review of Systems: Admission ROS by Jesusita Liriano MD from 2/13/2019 were reviewed with the patient and changes (other than per HPI) include: none      Objective:     Physical Exam:  Visit Vitals  /72 (BP 1 Location: Right arm, BP Patient Position: Sitting)   Pulse (!) 55   Temp 98 °F (36.7 °C)   Resp 15   Ht 5' (1.524 m)   Wt 73.1 kg (161 lb 2.5 oz)   SpO2 95%   BMI 31.47 kg/m²     SpO2 Readings from Last 6 Encounters:   02/15/19 95%   12/30/15 97%   08/02/15 97%   06/30/15 97%   11/26/14 96%   10/05/12 96%        No intake or output data in the 24 hours ending 02/15/19 0959   General: no distress, comfortable  Skin:  No rash or palpable dermatologic mass lesions  HEENT: Pupils equal, sclera anicteric, oropharynx with no gross lesions  Cardiovascular: No abnormal audible heart sounds, well perfused, no edema  Respiratory:  No abnormal audible breath sounds, normal respiratory effort, no throacic deformity  GI:  Bowel sounds hypoactive, soft, mild distention, mild diffuse tenderness. No rebound or guarding. Musculoskeletal:  No skeletal deformity nor acute arthritis noted. Neurological:  Motor and sensory function intact in upper extremeties  Psychiatric:  Normal affect, memory intact, appears to have insight into current illness  Lymphatic:  No cervical, supraclavicular, or periumbilic lymphadenopathy    Laboratory:    No results found for this or any previous visit (from the past 24 hour(s)). Assessment/Plan:     Active Problems:    Small bowel obstruction ([address] (2/13/2019)         See above narrative for full detail.     Nick Matamoros PA-C  02/15/19  10:00 AM

## 2019-02-15 NOTE — PROGRESS NOTES
Visit documented 2/15/19 Spiritual Care Partner Volunteer visited patient in [address] [place] [address] on 2/14/19. Documented by: Marilee Joseph., MS., [address] ([id number])

## 2019-02-15 NOTE — PROGRESS NOTES
2/15/2019 6:41 PM EMR reviewed, no discharge needs identified at this time. CM will follow. MERCED Tamayo

## 2019-02-15 NOTE — PROGRESS NOTES
SURGERY PROGRESS NOTE Admit Date: 2019 POD * No surgery found * Procedure: * No surgery found * Subjective: Has resumed bowel function. Objective:  
 
Visit Vitals /78 (BP 1 Location: Right arm) Pulse 61 Temp 97.6 °F (36.4 °C) Resp 13 Ht 5' (1.524 m) Wt 73.1 kg (161 lb 2.5 oz) SpO2 98% BMI 31.47 kg/m² Temp (24hrs), Av °F (36.7 °C), Min:97.6 °F (36.4 °C), Max:98.7 °F (37.1 °C) Physical Exam: Abdomen:  Soft. Non-tender, non-distended Lab Results Component Value Date/Time WBC 10.1 2019 04:17 AM  
 HGB 12.4 2019 04:17 AM  
 HCT 38.9 2019 04:17 AM  
 PLATELET 167  04:17 AM  
 MCV 90.0 2019 04:17 AM  
 
Lab Results Component Value Date/Time GFR est non-AA 57 (L) 2019 04:17 AM  
 GFR est AA >60 2019 04:17 AM  
 Creatinine 0.95 2019 04:17 AM  
 BUN 14 2019 04:17 AM  
 Sodium 140 2019 04:17 AM  
 Potassium 3.6 2019 04:17 AM  
 Chloride 106 2019 04:17 AM  
 CO2 23 2019 04:17 AM  
 Magnesium 2.4 10/05/2012 08:20 PM  
 
 
Assessment:  
 
Active Problems: 
  Small bowel obstruction ([address] (2019) Plan/Recommendations/Medical Decision Making:  
Clears as tolerates, possible advance in AM 
Bowel regimen for constipation per GI 
OOB ambulate

## 2019-02-16 VITALS
HEART RATE: 61 BPM | DIASTOLIC BLOOD PRESSURE: 69 MMHG | HEIGHT: 60 IN | SYSTOLIC BLOOD PRESSURE: 131 MMHG | OXYGEN SATURATION: 95 % | WEIGHT: 161.16 LBS | RESPIRATION RATE: 16 BRPM | TEMPERATURE: 98.1 F | BODY MASS INDEX: 31.64 KG/M2

## 2019-02-16 PROCEDURE — 74011250637 HC RX REV CODE- 250/637: Performed by: SURGERY

## 2019-02-16 PROCEDURE — 74011000258 HC RX REV CODE- 258: Performed by: SURGERY

## 2019-02-16 RX ADMIN — DOCUSATE SODIUM 100 MG: 100 CAPSULE, LIQUID FILLED ORAL at 08:57

## 2019-02-16 RX ADMIN — DEXTROSE MONOHYDRATE AND SODIUM CHLORIDE 125 ML/HR: 5; .45 INJECTION, SOLUTION INTRAVENOUS at 04:49

## 2019-02-16 RX ADMIN — Medication 5 ML: at 09:00

## 2019-02-16 RX ADMIN — POLYETHYLENE GLYCOL 3350 17 G: 17 POWDER, FOR SOLUTION ORAL at 08:57

## 2019-02-16 RX ADMIN — SERTRALINE HYDROCHLORIDE 100 MG: 50 TABLET ORAL at 08:57

## 2019-02-16 RX ADMIN — BUPROPION HYDROCHLORIDE 150 MG: 150 TABLET, FILM COATED, EXTENDED RELEASE ORAL at 08:57

## 2019-02-16 RX ADMIN — PANTOPRAZOLE SODIUM 40 MG: 40 TABLET, DELAYED RELEASE ORAL at 06:27

## 2019-02-16 NOTE — PROGRESS NOTES
Received a call from patient advocacy, that son of patient Chalo Persaud (lives in [place]), called the hospital concerned about care his mother was receiving. This RN spent 45 min. On telephone with son explaining plan of care, what MD's have visited patient the date and time, and general treatment plan M. D's were following. Son was reassured that all disciplines on team were reviewing plan of care with mom, and keeping her informed of plans as orders were received, and explaining course of action.

## 2019-02-16 NOTE — PROGRESS NOTES
Bedside shift change report given to [facility]  by Rocio Pearson . Report included the following information SBAR, Kardex, ED Summary, Procedure Summary, Intake/Output, MAR, Accordion, Recent Results, Cardiac Rhythm snr and Alarm Parameters .

## 2019-02-16 NOTE — PROGRESS NOTES
Patient has met discharge criteria. IVs removed. Instructed to follow up with primary care and GI. No prescriptions given. Patient has instructions to continue miralax daily at home. Patient states she has miralax at home already.

## 2019-02-16 NOTE — PROGRESS NOTES
Patient tolerated lunch well and has had no issues with nausea or vomiting. Patient requesting to go home at this time. Patient has met criteria at this time. Will gather discharge information

## 2019-02-16 NOTE — DISCHARGE SUMMARY
General Surgery Daily Progress Note    Patient: Ary Hudson MRN: [id number]  SSN: [id number]    YOB: 1940  Age: 69 y.o. Sex: female      Admit Date: 2/13/2019    Subjective:   Patient reports doing well. Denies N/V. Passing flatus. Had some BM with enema. Hungry    Current Facility-Administered Medications   Medication Dose Route Frequency    docusate sodium (COLACE) capsule 100 mg  100 mg Oral BID    polyethylene glycol (MIRALAX) packet 17 g  17 g Oral DAILY    sodium chloride (NS) flush 5-40 mL  5-40 mL IntraVENous Q8H    sodium chloride (NS) flush 5-40 mL  5-40 mL IntraVENous PRN    dextrose 5 % - 0.45% NaCl infusion  125 mL/hr IntraVENous CONTINUOUS    ondansetron (ZOFRAN) injection 4 mg  4 mg IntraVENous Q4H PRN    enoxaparin (LOVENOX) injection 40 mg  40 mg SubCUTAneous Q24H    buPROPion XL (WELLBUTRIN XL) tablet 150 mg  150 mg Oral DAILY    pantoprazole (PROTONIX) tablet 40 mg  40 mg Oral ACB    sertraline (ZOLOFT) tablet 100 mg  100 mg Oral DAILY    pravastatin (PRAVACHOL) tablet 40 mg  40 mg Oral QHS    morphine injection 2 mg  2 mg IntraVENous Q4H PRN        Objective:   No intake/output data recorded. No intake/output data recorded. Patient Vitals for the past 8 hrs:   BP Temp Pulse Resp SpO2   02/16/19 0724 127/72 98.2 °F (36.8 °C) 66 14 94 %   02/16/19 0450 117/68 98.9 °F (37.2 °C) (!) 56 13 97 %       Physical Exam:  General: Alert, cooperative, NAD  Lungs: Unlabored  Abdomen: Soft, non tender, non distended.     Extremities: Warm, moves all, no edema  Skin:  Warm and dry, no rash    Labs:   Recent Labs     02/14/19 0417   WBC 10.1   HGB 12.4   HCT 38.9        Recent Labs     02/14/19  0417 02/13/19  1121    139   K 3.6 4.1    102   CO2 23 29   * 115*   BUN 14 22*   CREA 0.95 1.18*   CA 8.5 9.1   ALB  --  4.2   TBILI  --  0.6   SGOT  --  23   ALT  --  33       Assessment / Plan:   · Advance to regular diet  · If tolerates lunch without N/V ok to go home

## 2019-02-20 NOTE — DISCHARGE SUMMARY
DISCHARGE SUMMARY      Patient ID:  Ronny Camacho  [id number]  69 y.o.  1940    Admit date: 2/13/2019    Discharge date and time: 2/16/2019  3:10 PM     Admitting Physician: Annemarie Walls MD     Discharge Physician: Shereen Weiss    Admission Diagnoses: Small bowel obstruction [facility]) [W01.126]    Procedures: * No surgery found *    Discharge Diagnoses: Active Problems:    Small bowel obstruction ([address] (2/13/2019)        Consults: GI    Significant Diagnostic Studies: radiology: CT scan:     Hospital Course:  Pt admitted with CT c/w pSBO.  Resolved with bowel rest and hydration.  On discharge pt is without pain, ambulating, and tolerating her diet. D/C Disposition: home     Patient Instructions:   Cannot display discharge medications since this patient is not currently admitted.       Diet: resume pre-hospital diet    Follow-up with PCP as needed       Signed:  Annemarie Walls MD  2/20/2019  12:50 PM

## 2020-10-27 ENCOUNTER — HOSPITAL ENCOUNTER (EMERGENCY)
Age: 80
Discharge: HOME | End: 2020-10-27
Attending: EMERGENCY MEDICINE
Payer: MEDICARE

## 2020-10-27 ENCOUNTER — APPOINTMENT (OUTPATIENT)
Dept: CT IMAGING | Age: 80
End: 2020-10-27
Attending: EMERGENCY MEDICINE
Payer: MEDICARE

## 2020-10-27 VITALS
WEIGHT: 155.2 LBS | HEIGHT: 61 IN | TEMPERATURE: 97.3 F | HEART RATE: 71 BPM | SYSTOLIC BLOOD PRESSURE: 154 MMHG | DIASTOLIC BLOOD PRESSURE: 70 MMHG | RESPIRATION RATE: 16 BRPM | BODY MASS INDEX: 29.3 KG/M2 | OXYGEN SATURATION: 96 %

## 2020-10-27 DIAGNOSIS — K57.32 SIGMOID DIVERTICULITIS: Primary | ICD-10-CM

## 2020-10-27 LAB
ALBUMIN SERPL-MCNC: 3.6 G/DL (ref 3.5–5)
ALBUMIN/GLOB SERPL: 1 {RATIO} (ref 1.1–2.2)
ALP SERPL-CCNC: 60 U/L (ref 45–117)
ALT SERPL-CCNC: 20 U/L (ref 12–78)
ANION GAP SERPL CALC-SCNC: 7 MMOL/L (ref 5–15)
APPEARANCE UR: CLEAR
AST SERPL-CCNC: 18 U/L (ref 15–37)
BACTERIA URNS QL MICRO: ABNORMAL /HPF
BASOPHILS # BLD: 0 K/UL (ref 0–0.1)
BASOPHILS NFR BLD: 1 % (ref 0–1)
BILIRUB SERPL-MCNC: 1.1 MG/DL (ref 0.2–1)
BILIRUB UR QL: NEGATIVE
BUN SERPL-MCNC: 20 MG/DL (ref 6–20)
BUN/CREAT SERPL: 19 (ref 12–20)
CALCIUM SERPL-MCNC: 9.1 MG/DL (ref 8.5–10.1)
CHLORIDE SERPL-SCNC: 101 MMOL/L (ref 97–108)
CO2 SERPL-SCNC: 29 MMOL/L (ref 21–32)
COLOR UR: ABNORMAL
COMMENT, HOLDF: NORMAL
CREAT SERPL-MCNC: 1.04 MG/DL (ref 0.55–1.02)
DIFFERENTIAL METHOD BLD: NORMAL
EOSINOPHIL # BLD: 0.1 K/UL (ref 0–0.4)
EOSINOPHIL NFR BLD: 1 % (ref 0–7)
EPITH CASTS URNS QL MICRO: ABNORMAL /LPF
ERYTHROCYTE [DISTWIDTH] IN BLOOD BY AUTOMATED COUNT: 12 % (ref 11.5–14.5)
GLOBULIN SER CALC-MCNC: 3.6 G/DL (ref 2–4)
GLUCOSE SERPL-MCNC: 102 MG/DL (ref 65–100)
GLUCOSE UR STRIP.AUTO-MCNC: NEGATIVE MG/DL
HCT VFR BLD AUTO: 42.4 % (ref 35–47)
HGB BLD-MCNC: 13.7 G/DL (ref 11.5–16)
HGB UR QL STRIP: ABNORMAL
IMM GRANULOCYTES # BLD AUTO: 0 K/UL (ref 0–0.04)
IMM GRANULOCYTES NFR BLD AUTO: 0 % (ref 0–0.5)
KETONES UR QL STRIP.AUTO: NEGATIVE MG/DL
LEUKOCYTE ESTERASE UR QL STRIP.AUTO: NEGATIVE
LYMPHOCYTES # BLD: 1.8 K/UL (ref 0.8–3.5)
LYMPHOCYTES NFR BLD: 21 % (ref 12–49)
MCH RBC QN AUTO: 29 PG (ref 26–34)
MCHC RBC AUTO-ENTMCNC: 32.3 G/DL (ref 30–36.5)
MCV RBC AUTO: 89.8 FL (ref 80–99)
MONOCYTES # BLD: 0.9 K/UL (ref 0–1)
MONOCYTES NFR BLD: 11 % (ref 5–13)
MUCOUS THREADS URNS QL MICRO: ABNORMAL /LPF
NEUTS SEG # BLD: 5.4 K/UL (ref 1.8–8)
NEUTS SEG NFR BLD: 66 % (ref 32–75)
NITRITE UR QL STRIP.AUTO: NEGATIVE
NRBC # BLD: 0 K/UL (ref 0–0.01)
NRBC BLD-RTO: 0 PER 100 WBC
PH UR STRIP: 6.5 [PH] (ref 5–8)
PLATELET # BLD AUTO: 211 K/UL (ref 150–400)
PMV BLD AUTO: 10.2 FL (ref 8.9–12.9)
POTASSIUM SERPL-SCNC: 4 MMOL/L (ref 3.5–5.1)
PROT SERPL-MCNC: 7.2 G/DL (ref 6.4–8.2)
PROT UR STRIP-MCNC: NEGATIVE MG/DL
RBC # BLD AUTO: 4.72 M/UL (ref 3.8–5.2)
RBC #/AREA URNS HPF: ABNORMAL /HPF (ref 0–5)
SAMPLES BEING HELD,HOLD: NORMAL
SODIUM SERPL-SCNC: 137 MMOL/L (ref 136–145)
SP GR UR REFRACTOMETRY: 1.01 (ref 1–1.03)
UA: UC IF INDICATED,UAUC: ABNORMAL
UROBILINOGEN UR QL STRIP.AUTO: 0.2 EU/DL (ref 0.2–1)
WBC # BLD AUTO: 8.2 K/UL (ref 3.6–11)
WBC URNS QL MICRO: ABNORMAL /HPF (ref 0–4)

## 2020-10-27 PROCEDURE — 74011000636 HC RX REV CODE- 636: Performed by: EMERGENCY MEDICINE

## 2020-10-27 PROCEDURE — 80053 COMPREHEN METABOLIC PANEL: CPT

## 2020-10-27 PROCEDURE — 36415 COLL VENOUS BLD VENIPUNCTURE: CPT

## 2020-10-27 PROCEDURE — 74177 CT ABD & PELVIS W/CONTRAST: CPT

## 2020-10-27 PROCEDURE — 85025 COMPLETE CBC W/AUTO DIFF WBC: CPT

## 2020-10-27 PROCEDURE — 99283 EMERGENCY DEPT VISIT LOW MDM: CPT

## 2020-10-27 PROCEDURE — 81001 URINALYSIS AUTO W/SCOPE: CPT

## 2020-10-27 RX ORDER — DICYCLOMINE HYDROCHLORIDE 10 MG/1
20 CAPSULE ORAL 4 TIMES DAILY
Qty: 20 CAP | Refills: 0 | Status: SHIPPED | OUTPATIENT
Start: 2020-10-27

## 2020-10-27 RX ORDER — AMOXICILLIN AND CLAVULANATE POTASSIUM 875; 125 MG/1; MG/1
1 TABLET, FILM COATED ORAL 2 TIMES DAILY
Qty: 20 TAB | Refills: 0 | Status: SHIPPED | OUTPATIENT
Start: 2020-10-27 | End: 2020-11-06

## 2020-10-27 RX ADMIN — IOPAMIDOL 100 ML: 755 INJECTION, SOLUTION INTRAVENOUS at 10:30

## 2020-10-27 NOTE — ED TRIAGE NOTES
Pt ambulated to the treatment area. Pt states \"since Saturday I have had bad pain in my left lower stomach it hurts move of when I have a bowel movement I had a fever 100.1 on Sunday yesterday I didn't have any I am very prone to diverticulitis. \" Pt appears in no distress.

## 2020-10-27 NOTE — ED NOTES
The patient was discharged home by Dr Sheng Peters in stable condition. The patient is alert and oriented, in no respiratory distress. The patient's diagnosis, condition and treatment were explained. The patient expressed understanding. A discharge plan has been developed. A  was not involved in the process. Aftercare instructions were given. Pt ambulatory out of the ED.

## 2020-10-27 NOTE — ED PROVIDER NOTES
Date: 10/27/2020  Patient Name: Eula Segura    History of Presenting Illness     Chief Complaint   Patient presents with    Abdominal Pain       History Provided By: Patient    HPI: Eula Segura, 79 y.o. female presents to the ED with cc of LLQ pain that began 3 days ago. Patient states that she has had a history of recurrent diverticulitis in the past and was most recently treated for an episode approximately 1 month ago. She was treated with 2 antibiotics and completed her entire course. Patient states that she has also had a history of partial small bowel obstruction and constipation, so she was unsure of what might be causing this pain today. She states that she had a low-grade fever of 100.12 days ago but that has since resolved. She called her GI doctor today, but they referred her to the ER for further evaluation. She denies any associated chest pain, shortness of breath, nausea or vomiting. She states that she has been having increased urination but otherwise denies any dysuria or hematuria. She denies any black or bloody stools. There are no other complaints, changes, or physical findings at this time. PCP: Niles Cuevas MD    No current facility-administered medications on file prior to encounter. Current Outpatient Medications on File Prior to Encounter   Medication Sig Dispense Refill    CALCIUM CARBONATE (CORAL CALCIUM PO) Take 1 Tab by mouth daily.  PSYLLIUM SEED, WITH DEXTROSE, (FIBER PO) Take 1 Tab by mouth daily.  MULTIVIT &MINERALS/FERROUS FUM (MULTI VITAMIN PO) Take 1 Tab by mouth daily.  Cholecalciferol, Vitamin D3, (VITAMIN D3) 2,000 unit cap capsule Take  by mouth daily.  buPROPion XL (WELLBUTRIN XL) 150 mg tablet Take 150 mg by mouth every morning.  pravastatin (PRAVACHOL) 40 mg tablet Take 40 mg by mouth nightly.  sertraline (ZOLOFT) 50 mg tablet Take 100 mg by mouth daily.       acetaminophen (TYLENOL EXTRA STRENGTH) 500 mg tablet Take 1,000 mg by mouth every six (6) hours as needed for Pain.  BIFIDOBACTERIUM INFANTIS (ALIGN PO) Take 1 Tab by mouth daily.  aspirin 81 mg tablet Take 81 mg by mouth every Monday, Wednesday, Friday. Past History     Past Medical History:  Past Medical History:   Diagnosis Date    Depression     Diverticulitis     GERD (gastroesophageal reflux disease)     Hiatal hernia     High cholesterol     Sleep apnea     uses CPAP       Past Surgical History:  Past Surgical History:   Procedure Laterality Date    ABDOMEN SURGERY PROC UNLISTED      exploratory surgery    HX CATARACT REMOVAL      bilateral cataract extraction    HX COLONOSCOPY  12/18/2014    HX ENDOSCOPY  12/18/2014    HX ORTHOPAEDIC      right ankle surgery    HX ORTHOPAEDIC      left arm surgery    HX MARCIE AND BSO      HX TONSILLECTOMY         Family History:  Family History   Problem Relation Age of Onset    Heart Disease Mother     Heart Disease Father     Diabetes Father     Stroke Father     Diabetes Brother     Hypertension Brother     No Known Problems Son     No Known Problems Son        Social History:  Social History     Tobacco Use    Smoking status: Never Smoker    Smokeless tobacco: Never Used   Substance Use Topics    Alcohol use: No    Drug use: No       Allergies: Allergies   Allergen Reactions    Lipitor [Atorvastatin] Myalgia         Review of Systems   Review of Systems   Constitutional: Positive for fever. Negative for fatigue. HENT: Negative. Eyes: Negative. Respiratory: Negative for shortness of breath and wheezing. Cardiovascular: Negative for chest pain and leg swelling. Gastrointestinal: Positive for abdominal pain (LLQ pain) and constipation. Negative for blood in stool, diarrhea, nausea and vomiting. Endocrine: Negative. Genitourinary: Positive for frequency. Negative for difficulty urinating and dysuria. Musculoskeletal: Negative. Skin: Negative for rash. Allergic/Immunologic: Negative. Neurological: Negative for weakness and numbness. Hematological: Negative. Psychiatric/Behavioral: Negative. Physical Exam   Physical Exam  Constitutional:       Appearance: She is well-developed. HENT:      Head: Normocephalic and atraumatic. Eyes:      Pupils: Pupils are equal, round, and reactive to light. Neck:      Musculoskeletal: Normal range of motion. Vascular: No JVD. Trachea: No tracheal deviation. Cardiovascular:      Rate and Rhythm: Normal rate and regular rhythm. Heart sounds: Normal heart sounds. No murmur. No friction rub. No gallop. Pulmonary:      Effort: Pulmonary effort is normal.      Breath sounds: Normal breath sounds. No stridor. No wheezing or rales. Abdominal:      General: Bowel sounds are normal. There is no distension. Palpations: Abdomen is soft. There is no mass. Tenderness: There is abdominal tenderness in the left lower quadrant. There is no guarding or rebound. Musculoskeletal: Normal range of motion. General: No tenderness. Skin:     General: Skin is warm and dry. Findings: No rash. Neurological:      Mental Status: She is alert and oriented to person, place, and time. Psychiatric:         Behavior: Behavior normal.         Thought Content:  Thought content normal.         Judgment: Judgment normal.         Diagnostic Study Results     Labs -     Recent Results (from the past 12 hour(s))   METABOLIC PANEL, COMPREHENSIVE    Collection Time: 10/27/20  9:38 AM   Result Value Ref Range    Sodium 137 136 - 145 mmol/L    Potassium 4.0 3.5 - 5.1 mmol/L    Chloride 101 97 - 108 mmol/L    CO2 29 21 - 32 mmol/L    Anion gap 7 5 - 15 mmol/L    Glucose 102 (H) 65 - 100 mg/dL    BUN 20 6 - 20 MG/DL    Creatinine 1.04 (H) 0.55 - 1.02 MG/DL    BUN/Creatinine ratio 19 12 - 20      GFR est AA >60 >60 ml/min/1.73m2    GFR est non-AA 51 (L) >60 ml/min/1.73m2    Calcium 9.1 8.5 - 10.1 MG/DL Bilirubin, total 1.1 (H) 0.2 - 1.0 MG/DL    ALT (SGPT) 20 12 - 78 U/L    AST (SGOT) 18 15 - 37 U/L    Alk. phosphatase 60 45 - 117 U/L    Protein, total 7.2 6.4 - 8.2 g/dL    Albumin 3.6 3.5 - 5.0 g/dL    Globulin 3.6 2.0 - 4.0 g/dL    A-G Ratio 1.0 (L) 1.1 - 2.2     URINALYSIS W/ REFLEX CULTURE    Collection Time: 10/27/20  9:38 AM    Specimen: Urine   Result Value Ref Range    Color YELLOW/STRAW      Appearance CLEAR CLEAR      Specific gravity 1.010 1.003 - 1.030      pH (UA) 6.5 5.0 - 8.0      Protein Negative NEG mg/dL    Glucose Negative NEG mg/dL    Ketone Negative NEG mg/dL    Bilirubin Negative NEG      Blood SMALL (A) NEG      Urobilinogen 0.2 0.2 - 1.0 EU/dL    Nitrites Negative NEG      Leukocyte Esterase Negative NEG      WBC 0-4 0 - 4 /hpf    RBC 5-10 0 - 5 /hpf    Epithelial cells MODERATE (A) FEW /lpf    Bacteria 1+ (A) NEG /hpf    UA:UC IF INDICATED CULTURE NOT INDICATED BY UA RESULT CNI      Mucus TRACE (A) NEG /lpf   CBC WITH AUTOMATED DIFF    Collection Time: 10/27/20 10:17 AM   Result Value Ref Range    WBC 8.2 3.6 - 11.0 K/uL    RBC 4.72 3.80 - 5.20 M/uL    HGB 13.7 11.5 - 16.0 g/dL    HCT 42.4 35.0 - 47.0 %    MCV 89.8 80.0 - 99.0 FL    MCH 29.0 26.0 - 34.0 PG    MCHC 32.3 30.0 - 36.5 g/dL    RDW 12.0 11.5 - 14.5 %    PLATELET 785 290 - 723 K/uL    MPV 10.2 8.9 - 12.9 FL    NRBC 0.0 0.0  WBC    ABSOLUTE NRBC 0.00 0.00 - 0.01 K/uL    NEUTROPHILS 66 32 - 75 %    LYMPHOCYTES 21 12 - 49 %    MONOCYTES 11 5 - 13 %    EOSINOPHILS 1 0 - 7 %    BASOPHILS 1 0 - 1 %    IMMATURE GRANULOCYTES 0 0 - 0.5 %    ABS. NEUTROPHILS 5.4 1.8 - 8.0 K/UL    ABS. LYMPHOCYTES 1.8 0.8 - 3.5 K/UL    ABS. MONOCYTES 0.9 0.0 - 1.0 K/UL    ABS. EOSINOPHILS 0.1 0.0 - 0.4 K/UL    ABS. BASOPHILS 0.0 0.0 - 0.1 K/UL    ABS. IMM.  GRANS. 0.0 0.00 - 0.04 K/UL    DF AUTOMATED     SAMPLES BEING HELD    Collection Time: 10/27/20 10:17 AM   Result Value Ref Range    SAMPLES BEING HELD 1PST     COMMENT        Add-on orders for these samples will be processed based on acceptable specimen integrity and analyte stability, which may vary by analyte. Radiologic Studies -   CT ABD PELV W CONT   Final Result   IMPRESSION: Uncomplicated sigmoid diverticulitis. Incidentals as above. CT Results  (Last 48 hours)               10/27/20 1030  CT ABD PELV W CONT Final result    Impression:  IMPRESSION: Uncomplicated sigmoid diverticulitis. Incidentals as above. Narrative:  EXAM: CT ABD PELV W CONT       INDICATION: LLQ pain, hx of diverticulitis and partial SBO       COMPARISON: CT 2/13/2019. CONTRAST: 100 mL of Isovue-370. TECHNIQUE:    Following the uneventful intravenous administration of contrast, thin axial   images were obtained through the abdomen and pelvis. Coronal and sagittal   reconstructions were generated. Oral contrast was not administered. CT dose   reduction was achieved through use of a standardized protocol tailored for this   examination and automatic exposure control for dose modulation. Adaptive   statistical iterative reconstruction (ASIR) was utilized. FINDINGS:    LOWER THORAX: No significant abnormality in the incidentally imaged lower chest.   LIVER: No mass. BILIARY TREE: Gallbladder is within normal limits. CBD is not dilated. SPLEEN: Unremarkable. PANCREAS: No mass or ductal dilatation. ADRENALS: No cemented change in mild lobularity of the inferior limb of left   adrenal. Otherwise unremarkable. KIDNEYS: No mass, calculus, or hydronephrosis. STOMACH: Small hiatal hernia. Otherwise unremarkable. SMALL BOWEL: No dilatation or wall thickening. COLON: Sigmoid diverticula with mild peridiverticular inflammatory stranding and   no extraluminal collection or gas. No dilation or wall thickening. APPENDIX: Unremarkable. PERITONEUM: No ascites or pneumoperitoneum. RETROPERITONEUM: Atherosclerotic calcination without aneurysm or dissection. No   enlarged lymphadenopathy. REPRODUCTIVE ORGANS: Uterus and ovaries are surgically absent. URINARY BLADDER: No mass or calculus. BONES: Generous spine change and osteoarthritic changes of the hips. No acute   fracture or aggressive lesion. ABDOMINAL WALL: No mass or hernia. ADDITIONAL COMMENTS: N/A               CXR Results  (Last 48 hours)    None          Medical Decision Making   I am the first provider for this patient. I reviewed the vital signs, available nursing notes, past medical history, past surgical history, family history and social history. Vital Signs-Reviewed the patient's vital signs. Patient Vitals for the past 12 hrs:   Temp Pulse Resp BP SpO2   10/27/20 0916 97.3 °F (36.3 °C) 77 16 (!) 175/75 96 %         Records Reviewed: Nursing Notes and Old Medical Records    Provider Notes (Medical Decision Making):   Patient with a history of recurrent diverticulitis, partial small bowel obstruction and constipation now presenting with a several day history of left lower quadrant pain. Abdominal exam is benign and patient appears in no acute distress. Will check labs, UA, CT abdomen and pelvis. ED Course:   Initial assessment performed. The patients presenting problems have been discussed, and they are in agreement with the care plan formulated and outlined with them. I have encouraged them to ask questions as they arise throughout their visit. ED Course as of Oct 27 1124   Tue Oct 27, 2020   1119 Updated patient on her lab and imaging results. Will discharge with antibiotics and analgesia. [CK]      ED Course User Index  [CK] Jodee Barraza, [address] [place] [address],       Disposition:  Discharge    DISCHARGE PLAN:  1. Discharge Medication List as of 10/27/2020 11:31 AM      START taking these medications    Details   amoxicillin-clavulanate (Augmentin) 875-125 mg per tablet Take 1 Tab by mouth two (2) times a day for 10 days. , Normal, Disp-20 Tab,R-0      dicyclomine (BentyL) 10 mg capsule Take 2 Caps by mouth four (4) times daily. , Normal, Disp-20 Cap,R-0         CONTINUE these medications which have NOT CHANGED    Details   CALCIUM CARBONATE (CORAL CALCIUM PO) Take 1 Tab by mouth daily. , Historical Med      PSYLLIUM SEED, WITH DEXTROSE, (FIBER PO) Take 1 Tab by mouth daily. , Historical Med      MULTIVIT &MINERALS/FERROUS FUM (MULTI VITAMIN PO) Take 1 Tab by mouth daily. , Historical Med      Cholecalciferol, Vitamin D3, (VITAMIN D3) 2,000 unit cap capsule Take  by mouth daily. , Historical Med      buPROPion XL (WELLBUTRIN XL) 150 mg tablet Take 150 mg by mouth every morning., Historical Med      pravastatin (PRAVACHOL) 40 mg tablet Take 40 mg by mouth nightly., Historical Med      sertraline (ZOLOFT) 50 mg tablet Take 100 mg by mouth daily. , Historical Med      acetaminophen (TYLENOL EXTRA STRENGTH) 500 mg tablet Take 1,000 mg by mouth every six (6) hours as needed for Pain., Historical Med      BIFIDOBACTERIUM INFANTIS (ALIGN PO) Take 1 Tab by mouth daily. , Historical Med      aspirin 81 mg tablet Take 81 mg by mouth every Monday, Wednesday, Friday., Historical Med           2. Follow-up Information     Follow up With Specialties Details Why Contact Info    Leah Boyd MD [facility] Medicine Schedule an appointment as soon as possible for a visit  [address] [place] [address]  [phone]      Your GI doctor  Schedule an appointment as soon as possible for a visit      [facility] EMERGENCY DEPT Emergency Medicine  As needed, If symptoms worsen [facility] [address]  [phone]        3. Return to ED if worse     Diagnosis     Clinical Impression:   1. Sigmoid diverticulitis        Attestations:    Maycol Banerjee, DO    Please note that this dictation was completed with [employer], the [email or web address] voice recognition software.   Quite often unanticipated grammatical, syntax, homophones, and other interpretive errors are inadvertently transcribed by the computer software. Please disregard these errors. Please excuse any errors that have escaped final proofreading. Thank you.

## 2020-10-27 NOTE — DISCHARGE INSTRUCTIONS
Patient Education        Diverticulitis: Care Instructions  Overview     Diverticulitis occurs when pouches form in the wall of the colon and become inflamed or infected. It can be very painful. Doctors aren't sure what causes diverticulitis. There is no proof that foods such as nuts, seeds, or berries cause it or make it worse. A low-fiber diet may cause the colon to work harder to push stool forward. Pouches may form because of this extra work. It may be hard to think about healthy eating while you're in pain. But as you recover, you might think about how you can use healthy eating for overall better health. Healthy eating may help you avoid future attacks. Follow-up care is a key part of your treatment and safety. Be sure to make and go to all appointments, and call your doctor if you are having problems. It's also a good idea to know your test results and keep a list of the medicines you take. How can you care for yourself at home? · Drink plenty of fluids, enough so that your urine is light yellow or clear like water. If you have kidney, heart, or liver disease and have to limit fluids, talk with your doctor before you increase the amount of fluids you drink. · Stay with liquids or a bland diet (plain rice, bananas, dry toast or crackers, applesauce) until you are feeling better. Then you can return to regular foods and slowly increase the amount of fiber in your diet. · Use a heating pad set on low on your belly to relieve mild cramps and pain. · Get extra rest until you are feeling better. · Be safe with medicines. Read and follow all instructions on the label. ? If the doctor gave you a prescription medicine for pain, take it as prescribed. ? If you are not taking a prescription pain medicine, ask your doctor if you can take an over-the-counter medicine. · If your doctor prescribed antibiotics, take them as directed. Do not stop taking them just because you feel better.  You need to take the full course of antibiotics. · Do not use laxatives or enemas unless your doctor tells you to use them. When should you call for help? Call your doctor now or seek immediate medical care if:    · You have a fever.     · You are vomiting.     · You have new or worse belly pain.     · You cannot pass stools or gas. Watch closely for changes in your health, and be sure to contact your doctor if you have any problems. Where can you learn more? Go to http://www.gray.com/  Enter H901 in the search box to learn more about \"Diverticulitis: Care Instructions. \"  Current as of: April 15, 2020               Content Version: 12.6  © 8577-1198 [employer], [employer]. Care instructions adapted under license by [employer] (which disclaims liability or warranty for this information). If you have questions about a medical condition or this instruction, always ask your healthcare professional. [address] any warranty or liability for your use of this information.

## 2020-10-27 NOTE — ED NOTES
Bedside shift change report given to JACQUELYN Maravilla RN (oncoming nurse) by Janny Eller RN (offgoing nurse). Report included the following information SBAR.

## 2020-11-20 ENCOUNTER — TRANSCRIBE ORDER (OUTPATIENT)
Dept: SCHEDULING | Age: 80
End: 2020-11-20

## 2020-11-20 DIAGNOSIS — K57.92 DIVERTICULITIS: Primary | ICD-10-CM

## 2020-11-20 DIAGNOSIS — R19.4 CHANGE IN BOWEL HABIT: ICD-10-CM

## 2020-11-20 DIAGNOSIS — R10.32 LEFT LOWER QUADRANT ABDOMINAL PAIN: ICD-10-CM

## 2020-12-04 ENCOUNTER — HOSPITAL ENCOUNTER (OUTPATIENT)
Dept: CT IMAGING | Age: 80
Discharge: HOME | End: 2020-12-04
Attending: INTERNAL MEDICINE
Payer: MEDICARE

## 2020-12-04 DIAGNOSIS — K57.92 DIVERTICULITIS: ICD-10-CM

## 2020-12-04 DIAGNOSIS — R19.4 CHANGE IN BOWEL HABIT: ICD-10-CM

## 2020-12-04 DIAGNOSIS — R10.32 LEFT LOWER QUADRANT ABDOMINAL PAIN: ICD-10-CM

## 2020-12-04 PROCEDURE — 74011000636 HC RX REV CODE- 636: Performed by: INTERNAL MEDICINE

## 2020-12-04 PROCEDURE — 74177 CT ABD & PELVIS W/CONTRAST: CPT

## 2020-12-04 RX ADMIN — IOPAMIDOL 100 ML: 755 INJECTION, SOLUTION INTRAVENOUS at 08:00

## 2021-06-12 ENCOUNTER — APPOINTMENT (OUTPATIENT)
Dept: CT IMAGING | Age: 81
End: 2021-06-12
Attending: EMERGENCY MEDICINE
Payer: MEDICARE

## 2021-06-12 ENCOUNTER — HOSPITAL ENCOUNTER (EMERGENCY)
Age: 81
Discharge: HOME | End: 2021-06-13
Attending: EMERGENCY MEDICINE
Payer: MEDICARE

## 2021-06-12 DIAGNOSIS — K57.92 DIVERTICULITIS: Primary | ICD-10-CM

## 2021-06-12 LAB
ALBUMIN SERPL-MCNC: 3.9 G/DL (ref 3.5–5)
ALBUMIN/GLOB SERPL: 1.3 {RATIO} (ref 1.1–2.2)
ALP SERPL-CCNC: 68 U/L (ref 45–117)
ALT SERPL-CCNC: 28 U/L (ref 12–78)
ANION GAP SERPL CALC-SCNC: 10 MMOL/L (ref 5–15)
AST SERPL-CCNC: 19 U/L (ref 15–37)
BASOPHILS # BLD: 0 K/UL (ref 0–0.1)
BASOPHILS NFR BLD: 1 % (ref 0–1)
BILIRUB SERPL-MCNC: 0.8 MG/DL (ref 0.2–1)
BUN SERPL-MCNC: 19 MG/DL (ref 6–20)
BUN/CREAT SERPL: 18 (ref 12–20)
CALCIUM SERPL-MCNC: 9 MG/DL (ref 8.5–10.1)
CHLORIDE SERPL-SCNC: 103 MMOL/L (ref 97–108)
CO2 SERPL-SCNC: 26 MMOL/L (ref 21–32)
CREAT SERPL-MCNC: 1.08 MG/DL (ref 0.55–1.02)
DIFFERENTIAL METHOD BLD: ABNORMAL
EOSINOPHIL # BLD: 0.2 K/UL (ref 0–0.4)
EOSINOPHIL NFR BLD: 3 % (ref 0–7)
ERYTHROCYTE [DISTWIDTH] IN BLOOD BY AUTOMATED COUNT: 12 % (ref 11.5–14.5)
GLOBULIN SER CALC-MCNC: 3 G/DL (ref 2–4)
GLUCOSE SERPL-MCNC: 97 MG/DL (ref 65–100)
HCT VFR BLD AUTO: 40.4 % (ref 35–47)
HGB BLD-MCNC: 13.5 G/DL (ref 11.5–16)
IMM GRANULOCYTES # BLD AUTO: 0 K/UL (ref 0–0.04)
IMM GRANULOCYTES NFR BLD AUTO: 1 % (ref 0–0.5)
LIPASE SERPL-CCNC: 120 U/L (ref 73–393)
LYMPHOCYTES # BLD: 2.9 K/UL (ref 0.8–3.5)
LYMPHOCYTES NFR BLD: 41 % (ref 12–49)
MCH RBC QN AUTO: 29.9 PG (ref 26–34)
MCHC RBC AUTO-ENTMCNC: 33.4 G/DL (ref 30–36.5)
MCV RBC AUTO: 89.4 FL (ref 80–99)
MONOCYTES # BLD: 0.7 K/UL (ref 0–1)
MONOCYTES NFR BLD: 10 % (ref 5–13)
NEUTS SEG # BLD: 3.2 K/UL (ref 1.8–8)
NEUTS SEG NFR BLD: 46 % (ref 32–75)
NRBC # BLD: 0 K/UL (ref 0–0.01)
NRBC BLD-RTO: 0 PER 100 WBC
PLATELET # BLD AUTO: 201 K/UL (ref 150–400)
PMV BLD AUTO: 10.1 FL (ref 8.9–12.9)
POTASSIUM SERPL-SCNC: 3.6 MMOL/L (ref 3.5–5.1)
PROT SERPL-MCNC: 6.9 G/DL (ref 6.4–8.2)
RBC # BLD AUTO: 4.52 M/UL (ref 3.8–5.2)
SODIUM SERPL-SCNC: 139 MMOL/L (ref 136–145)
WBC # BLD AUTO: 7.1 K/UL (ref 3.6–11)

## 2021-06-12 PROCEDURE — 74011000636 HC RX REV CODE- 636: Performed by: EMERGENCY MEDICINE

## 2021-06-12 PROCEDURE — 74177 CT ABD & PELVIS W/CONTRAST: CPT

## 2021-06-12 PROCEDURE — 96374 THER/PROPH/DIAG INJ IV PUSH: CPT

## 2021-06-12 PROCEDURE — 85025 COMPLETE CBC W/AUTO DIFF WBC: CPT

## 2021-06-12 PROCEDURE — 99283 EMERGENCY DEPT VISIT LOW MDM: CPT

## 2021-06-12 PROCEDURE — 80053 COMPREHEN METABOLIC PANEL: CPT

## 2021-06-12 PROCEDURE — 36415 COLL VENOUS BLD VENIPUNCTURE: CPT

## 2021-06-12 PROCEDURE — 83690 ASSAY OF LIPASE: CPT

## 2021-06-12 RX ORDER — AMOXICILLIN AND CLAVULANATE POTASSIUM 875; 125 MG/1; MG/1
1 TABLET, FILM COATED ORAL 2 TIMES DAILY
Qty: 20 TABLET | Refills: 0 | Status: SHIPPED | OUTPATIENT
Start: 2021-06-12

## 2021-06-12 RX ORDER — AMLODIPINE BESYLATE 5 MG/1
5 TABLET ORAL DAILY
COMMUNITY

## 2021-06-12 RX ORDER — KETOROLAC TROMETHAMINE 30 MG/ML
15 INJECTION, SOLUTION INTRAMUSCULAR; INTRAVENOUS ONCE
Status: COMPLETED | OUTPATIENT
Start: 2021-06-12 | End: 2021-06-13

## 2021-06-12 RX ORDER — AMOXICILLIN AND CLAVULANATE POTASSIUM 875; 125 MG/1; MG/1
1 TABLET, FILM COATED ORAL
Status: COMPLETED | OUTPATIENT
Start: 2021-06-12 | End: 2021-06-13

## 2021-06-12 RX ADMIN — IOPAMIDOL 100 ML: 755 INJECTION, SOLUTION INTRAVENOUS at 23:36

## 2021-06-13 VITALS
WEIGHT: 160.27 LBS | HEIGHT: 61 IN | OXYGEN SATURATION: 100 % | TEMPERATURE: 98.4 F | BODY MASS INDEX: 30.26 KG/M2 | DIASTOLIC BLOOD PRESSURE: 80 MMHG | RESPIRATION RATE: 18 BRPM | HEART RATE: 74 BPM | SYSTOLIC BLOOD PRESSURE: 142 MMHG

## 2021-06-13 PROCEDURE — 74011250637 HC RX REV CODE- 250/637: Performed by: EMERGENCY MEDICINE

## 2021-06-13 PROCEDURE — 74011250636 HC RX REV CODE- 250/636: Performed by: EMERGENCY MEDICINE

## 2021-06-13 RX ADMIN — KETOROLAC TROMETHAMINE 15 MG: 30 INJECTION, SOLUTION INTRAMUSCULAR at 00:05

## 2021-06-13 RX ADMIN — AMOXICILLIN AND CLAVULANATE POTASSIUM 1 TABLET: 875; 125 TABLET, FILM COATED ORAL at 00:07

## 2021-06-13 NOTE — ED PROVIDER NOTES
76-year-old female history of diverticulitis, GERD, hiatal hernia, hypertension presents to the emergency department today with chief complaint of left lower quadrant abdominal pain which began today. This is similar to previous bouts of diverticulitis. No nausea or vomiting or fevers. No urinary symptoms. The history is provided by the patient and medical records. Abdominal Pain   This is a new problem. Episode onset: Today. The problem occurs constantly. The problem has been gradually worsening. The pain is located in the LLQ. The quality of the pain is dull. The pain is moderate. Pertinent negatives include no fever, no diarrhea, no nausea, no vomiting, no dysuria, no headaches, no arthralgias, no myalgias and no chest pain. Her past medical history is significant for diverticulitis.         Past Medical History:   Diagnosis Date    Depression     Diverticulitis     GERD (gastroesophageal reflux disease)     Hiatal hernia     High cholesterol     Hypertension     Sleep apnea     uses CPAP       Past Surgical History:   Procedure Laterality Date    HX CATARACT REMOVAL      bilateral cataract extraction    HX COLONOSCOPY  12/18/2014    HX ENDOSCOPY  12/18/2014    HX ORTHOPAEDIC      right ankle surgery    HX ORTHOPAEDIC      left arm surgery    HX MARCIE AND BSO      HX TONSILLECTOMY      NM ABDOMEN SURGERY PROC UNLISTED      exploratory surgery         Family History:   Problem Relation Age of Onset    Heart Disease Mother     Heart Disease Father     Diabetes Father     Stroke Father     Diabetes Brother     Hypertension Brother     No Known Problems Son     No Known Problems Son        Social History     Socioeconomic History    Marital status:      Spouse name: Not on file    Number of children: Not on file    Years of education: Not on file    Highest education level: Not on file   Occupational History    Not on file   Tobacco Use    Smoking status: Never Smoker    Smokeless tobacco: Never Used   Substance and Sexual Activity    Alcohol use: No    Drug use: No    Sexual activity: Not on file   Other Topics Concern    Not on file   Social History Narrative    Not on file     Social Determinants of Health     Financial Resource Strain:     Difficulty of Paying Living Expenses:    Food Insecurity:     Worried About Running Out of Food in the Last Year:     [address] in the Last Year:    Transportation Needs:     Lack of Transportation (Medical):  Lack of Transportation (Non-Medical):    Physical Activity:     Days of Exercise per Week:     Minutes of Exercise per Session:    Stress:     Feeling of Stress :    Social Connections:     Frequency of Communication with Friends and Family:     Frequency of Social Gatherings with Friends and Family:     Attends Sikh Services:     Active Member of Clubs or Organizations:     Attends Club or Organization Meetings:     Marital Status:    Intimate Partner Violence:     Fear of Current or Ex-Partner:     Emotionally Abused:     Physically Abused:     Sexually Abused: ALLERGIES: Lipitor [atorvastatin]    Review of Systems   Constitutional: Negative for fatigue and fever. HENT: Negative for sneezing and sore throat. Respiratory: Negative for cough and shortness of breath. Cardiovascular: Negative for chest pain and leg swelling. Gastrointestinal: Positive for abdominal pain. Negative for diarrhea, nausea and vomiting. Genitourinary: Negative for difficulty urinating and dysuria. Musculoskeletal: Negative for arthralgias and myalgias. Skin: Negative for color change and rash. Neurological: Negative for weakness and headaches. Psychiatric/Behavioral: Negative for agitation and behavioral problems.        Vitals:    06/12/21 2231   BP: (!) 174/83   Pulse: 71   Resp: 18   Temp: 98.4 °F (36.9 °C)   SpO2: 97%   Weight: 72.7 kg (160 lb 4.4 oz)   Height: 5' 1\" (1.549 m) Physical Exam  Vitals and nursing note reviewed. Constitutional:       General: She is not in acute distress. Appearance: Normal appearance. She is well-developed. She is not ill-appearing, toxic-appearing or diaphoretic. HENT:      Head: Normocephalic and atraumatic. Nose: Nose normal.      Mouth/Throat:      Mouth: Mucous membranes are moist.      Pharynx: Oropharynx is clear. Eyes:      Extraocular Movements: Extraocular movements intact. Conjunctiva/sclera: Conjunctivae normal.      Pupils: Pupils are equal, round, and reactive to light. Cardiovascular:      Rate and Rhythm: Normal rate and regular rhythm. Pulses: Normal pulses. Heart sounds: Normal heart sounds. Pulmonary:      Effort: Pulmonary effort is normal. No respiratory distress. Breath sounds: Normal breath sounds. No wheezing. Chest:      Chest wall: No tenderness. Abdominal:      General: Abdomen is flat. There is no distension. Palpations: Abdomen is soft. Tenderness: There is abdominal tenderness in the left lower quadrant. There is no guarding or rebound. Musculoskeletal:         General: No swelling, tenderness, deformity or signs of injury. Normal range of motion. Cervical back: Normal range of motion and neck supple. No rigidity. No muscular tenderness. Right lower leg: No edema. Left lower leg: No edema. Skin:     General: Skin is warm and dry. Capillary Refill: Capillary refill takes less than 2 seconds. Neurological:      General: No focal deficit present. Mental Status: She is alert and oriented to person, place, and time. Psychiatric:         Mood and Affect: Mood normal.         Behavior: Behavior normal.          MDM  Number of Diagnoses or Management Options  Diagnosis management comments: 29-year-old female presents as above with left lower quadrant abdominal tenderness. Her history and physical are consistent with diverticulitis.   She does not have leukocytosis, fever or abnormal CT findings. I suspect she is likely early enough on the course of her illness not have these findings yet. Plan to treat with antibiotics with instructions to follow-up with primary care and return if needed.        Amount and/or Complexity of Data Reviewed  Clinical lab tests: reviewed  Tests in the radiology section of CPT®: reviewed           Procedures

## 2021-06-13 NOTE — DISCHARGE INSTRUCTIONS
Thank you for allowing us to provide you with medical care today. We realize that you have many choices for your emergency care needs. We thank you for choosing [facility]. Please choose us in the future for any continued health care needs. We hope we addressed all of your medical concerns. We strive to provide excellent quality care in the Emergency Department. Anything less than excellent does not meet our expectations. The exam and treatment you received in the Emergency Department were for an emergent problem and are not intended as complete care. It is important that you follow up with a doctor, nurse practitioner, or physician's assistant for ongoing care. If your symptoms worsen or you do not improve as expected and you are unable to reach your usual health care provider, you should return to the Emergency Department. We are available 24 hours a day. Take this sheet with you when you go to your follow-up visit. If you have any problem arranging the follow-up visit, contact the Emergency Department immediately. Make an appointment your family doctor for follow up of this visit. Return to the ER if you are unable to be seen in a timely manner.

## 2022-03-19 PROBLEM — K56.609 SMALL BOWEL OBSTRUCTION (HCC): Status: ACTIVE | Noted: 2019-02-13

## 2022-10-06 ENCOUNTER — HOSPITAL ENCOUNTER (OUTPATIENT)
Dept: GENERAL RADIOLOGY | Age: 82
Discharge: HOME | End: 2022-10-06
Attending: INTERNAL MEDICINE
Payer: MEDICARE

## 2022-10-06 ENCOUNTER — TRANSCRIBE ORDER (OUTPATIENT)
Dept: GENERAL RADIOLOGY | Age: 82
End: 2022-10-06

## 2022-10-06 DIAGNOSIS — R06.02 SHORTNESS OF BREATH: Primary | ICD-10-CM

## 2022-10-06 DIAGNOSIS — R06.02 SHORTNESS OF BREATH: ICD-10-CM

## 2022-10-06 PROCEDURE — 71046 X-RAY EXAM CHEST 2 VIEWS: CPT

## 2022-10-26 NOTE — PROGRESS NOTES
Medical Progress Note NAME: Rashad Cruz :  1940 MRM:  [id number] Date/Time: 2019  12:24 PM 
  
Subjective: SBO Doing well 
abd soft nontender Passing gas BE no stricture left colon Diet being advanced and if tolerates home soon Past Medical History reviewed and unchanged from Admission History and Physical 
 
Review of Systems Objective:  
 
 
Vitals:  
  
Last 24hrs VS reviewed since prior progress note. Most recent are: 
 
Visit Vitals /72 (BP 1 Location: Left arm) Pulse 66 Temp 98.2 °F (36.8 °C) Resp 14 Ht 5' (1.524 m) Wt 73.1 kg (161 lb 2.5 oz) SpO2 94% BMI 31.47 kg/m² SpO2 Readings from Last 6 Encounters:  
19 94% 12/30/15 97% 08/02/15 97% 06/30/15 97% 14 96% 10/05/12 96% No intake or output data in the 24 hours ending 19 1224 Exam:  
 
 Physical Exam 
 
Telemetry reviewed:   normal sinus rhythm Tubes:   none X-Ray:  none Procedures:   none Lab Data Reviewed: (see below) Medications: 
Current Facility-Administered Medications Medication Dose Route Frequency  docusate sodium (COLACE) capsule 100 mg  100 mg Oral BID  polyethylene glycol (MIRALAX) packet 17 g  17 g Oral DAILY  sodium chloride (NS) flush 5-40 mL  5-40 mL IntraVENous Q8H  
 sodium chloride (NS) flush 5-40 mL  5-40 mL IntraVENous PRN  
 dextrose 5 % - 0.45% NaCl infusion  125 mL/hr IntraVENous CONTINUOUS  
 ondansetron (ZOFRAN) injection 4 mg  4 mg IntraVENous Q4H PRN  
 enoxaparin (LOVENOX) injection 40 mg  40 mg SubCUTAneous Q24H  
 buPROPion XL (WELLBUTRIN XL) tablet 150 mg  150 mg Oral DAILY  pantoprazole (PROTONIX) tablet 40 mg  40 mg Oral ACB  sertraline (ZOLOFT) tablet 100 mg  100 mg Oral DAILY  pravastatin (PRAVACHOL) tablet 40 mg  40 mg Oral QHS  morphine injection 2 mg  2 mg IntraVENous Q4H PRN  
 
 
______________________________________________________________________ Lab Review:  
 
Recent Labs  
  02/14/19 0417 WBC 10.1 HGB 12.4 HCT 38.9  Recent Labs  
  02/14/19 0417   
K 3.6  CO2 23 * BUN 14  
CREA 0.95  
CA 8.5 No components found for: [place] No results for input(s): PH, PCO2, PO2, HCO3, FIO2 in the last 72 hours. No results for input(s): INR in the last 72 hours. No lab exists for component: INREXT, INREXT Other pertinent lab: none Assessment:  
 
Active Problems: 
  Small bowel obstruction ([facility] [address] (2/13/2019) Plan:  
 
Risk of deterioration: low 1. Advance diet and then home Total time spent with patient: 15 Minutes Care Plan discussed with: Patient Discussed:  per hospitalist 
 
Prophylaxis:  per hospitalist 
 
Disposition:  Home w/Family 
        
___________________________________________________ Attending Physician: Otto Munoz MD  
 
 
 
 Family

## 2023-09-12 ENCOUNTER — HOSPITAL ENCOUNTER (EMERGENCY)
Facility: HOSPITAL | Age: 83
Discharge: HOME | End: 2023-09-12
Attending: STUDENT IN AN ORGANIZED HEALTH CARE EDUCATION/TRAINING PROGRAM
Payer: MEDICARE

## 2023-09-12 ENCOUNTER — APPOINTMENT (OUTPATIENT)
Facility: HOSPITAL | Age: 83
End: 2023-09-12
Payer: MEDICARE

## 2023-09-12 VITALS
WEIGHT: 158 LBS | TEMPERATURE: 98.5 F | HEART RATE: 59 BPM | SYSTOLIC BLOOD PRESSURE: 117 MMHG | HEIGHT: 61 IN | OXYGEN SATURATION: 94 % | DIASTOLIC BLOOD PRESSURE: 72 MMHG | RESPIRATION RATE: 17 BRPM | BODY MASS INDEX: 29.83 KG/M2

## 2023-09-12 DIAGNOSIS — R07.9 CHEST PAIN, UNSPECIFIED TYPE: ICD-10-CM

## 2023-09-12 DIAGNOSIS — M89.8X1 PAIN OF LEFT CLAVICLE: Primary | ICD-10-CM

## 2023-09-12 LAB
ALBUMIN SERPL-MCNC: 3.9 G/DL (ref 3.5–5)
ALBUMIN/GLOB SERPL: 1.2 (ref 1.1–2.2)
ALP SERPL-CCNC: 84 U/L (ref 45–117)
ALT SERPL-CCNC: 41 U/L (ref 12–78)
ANION GAP SERPL CALC-SCNC: 10 MMOL/L (ref 5–15)
AST SERPL-CCNC: 24 U/L (ref 15–37)
BASOPHILS # BLD: 0 K/UL (ref 0–0.1)
BASOPHILS NFR BLD: 1 % (ref 0–1)
BILIRUB SERPL-MCNC: 0.4 MG/DL (ref 0.2–1)
BUN SERPL-MCNC: 20 MG/DL (ref 6–20)
BUN/CREAT SERPL: 18 (ref 12–20)
CALCIUM SERPL-MCNC: 8.9 MG/DL (ref 8.5–10.1)
CHLORIDE SERPL-SCNC: 103 MMOL/L (ref 97–108)
CO2 SERPL-SCNC: 27 MMOL/L (ref 21–32)
CREAT SERPL-MCNC: 1.12 MG/DL (ref 0.55–1.02)
DIFFERENTIAL METHOD BLD: NORMAL
EOSINOPHIL # BLD: 0.2 K/UL (ref 0–0.4)
EOSINOPHIL NFR BLD: 3 % (ref 0–7)
ERYTHROCYTE [DISTWIDTH] IN BLOOD BY AUTOMATED COUNT: 11.9 % (ref 11.5–14.5)
GLOBULIN SER CALC-MCNC: 3.3 G/DL (ref 2–4)
GLUCOSE SERPL-MCNC: 153 MG/DL (ref 65–100)
HCT VFR BLD AUTO: 41.1 % (ref 35–47)
HGB BLD-MCNC: 14.2 G/DL (ref 11.5–16)
IMM GRANULOCYTES # BLD AUTO: 0 K/UL (ref 0–0.04)
IMM GRANULOCYTES NFR BLD AUTO: 0 % (ref 0–0.5)
LYMPHOCYTES # BLD: 2.3 K/UL (ref 0.8–3.5)
LYMPHOCYTES NFR BLD: 35 % (ref 12–49)
MCH RBC QN AUTO: 31.2 PG (ref 26–34)
MCHC RBC AUTO-ENTMCNC: 34.5 G/DL (ref 30–36.5)
MCV RBC AUTO: 90.3 FL (ref 80–99)
MONOCYTES # BLD: 0.6 K/UL (ref 0–1)
MONOCYTES NFR BLD: 10 % (ref 5–13)
NEUTS SEG # BLD: 3.3 K/UL (ref 1.8–8)
NEUTS SEG NFR BLD: 51 % (ref 32–75)
NRBC # BLD: 0 K/UL (ref 0–0.01)
NRBC BLD-RTO: 0 PER 100 WBC
PLATELET # BLD AUTO: 205 K/UL (ref 150–400)
PMV BLD AUTO: 9.9 FL (ref 8.9–12.9)
POTASSIUM SERPL-SCNC: 3.7 MMOL/L (ref 3.5–5.1)
PROT SERPL-MCNC: 7.2 G/DL (ref 6.4–8.2)
RBC # BLD AUTO: 4.55 M/UL (ref 3.8–5.2)
SODIUM SERPL-SCNC: 140 MMOL/L (ref 136–145)
TROPONIN I SERPL HS-MCNC: 19 NG/L (ref 0–51)
TROPONIN I SERPL HS-MCNC: 21 NG/L (ref 0–51)
WBC # BLD AUTO: 6.4 K/UL (ref 3.6–11)

## 2023-09-12 PROCEDURE — 84484 ASSAY OF TROPONIN QUANT: CPT

## 2023-09-12 PROCEDURE — 80053 COMPREHEN METABOLIC PANEL: CPT

## 2023-09-12 PROCEDURE — 93005 ELECTROCARDIOGRAM TRACING: CPT | Performed by: STUDENT IN AN ORGANIZED HEALTH CARE EDUCATION/TRAINING PROGRAM

## 2023-09-12 PROCEDURE — 36415 COLL VENOUS BLD VENIPUNCTURE: CPT

## 2023-09-12 PROCEDURE — 71046 X-RAY EXAM CHEST 2 VIEWS: CPT

## 2023-09-12 PROCEDURE — 99285 EMERGENCY DEPT VISIT HI MDM: CPT

## 2023-09-12 PROCEDURE — 85025 COMPLETE CBC W/AUTO DIFF WBC: CPT

## 2023-09-12 RX ORDER — FLUOXETINE 10 MG/1
CAPSULE ORAL
COMMUNITY
Start: 2023-09-08

## 2023-09-12 RX ORDER — ASPIRIN 81 MG/1
81 TABLET ORAL DAILY
COMMUNITY

## 2023-09-12 RX ORDER — MELOXICAM 7.5 MG/1
7.5 TABLET ORAL DAILY
COMMUNITY

## 2023-09-12 RX ORDER — M-VIT,TX,IRON,MINS/CALC/FOLIC 27MG-0.4MG
1 TABLET ORAL DAILY
COMMUNITY

## 2023-09-12 ASSESSMENT — PAIN - FUNCTIONAL ASSESSMENT: PAIN_FUNCTIONAL_ASSESSMENT: 0-10

## 2023-09-12 ASSESSMENT — PAIN DESCRIPTION - ORIENTATION: ORIENTATION: LEFT

## 2023-09-12 ASSESSMENT — PAIN DESCRIPTION - LOCATION: LOCATION: SHOULDER

## 2023-09-12 NOTE — ED TRIAGE NOTES
Patient presents to treatment area via EMS. Patient states she began with abrupt left clavicular pain this afternoon without injury. States she also felt \"warm\" extending up her neck. This has since resolved without medication. Patient states she did some yard work yesterday, but knows of no injury.

## 2023-09-13 LAB
EKG ATRIAL RATE: 67 BPM
EKG DIAGNOSIS: NORMAL
EKG P AXIS: 30 DEGREES
EKG P-R INTERVAL: 160 MS
EKG Q-T INTERVAL: 414 MS
EKG QRS DURATION: 110 MS
EKG QTC CALCULATION (BAZETT): 437 MS
EKG R AXIS: -28 DEGREES
EKG T AXIS: 101 DEGREES
EKG VENTRICULAR RATE: 67 BPM

## 2023-09-13 PROCEDURE — 93010 ELECTROCARDIOGRAM REPORT: CPT | Performed by: STUDENT IN AN ORGANIZED HEALTH CARE EDUCATION/TRAINING PROGRAM

## 2023-09-13 ASSESSMENT — HEART SCORE: ECG: 0

## 2023-09-13 ASSESSMENT — ENCOUNTER SYMPTOMS
ABDOMINAL PAIN: 0
SHORTNESS OF BREATH: 0

## 2023-09-13 NOTE — ED PROVIDER NOTES
[facility] EMERGENCY DEPT  EMERGENCY DEPARTMENT ENCOUNTER      Pt Name: Carrol Schultz  MRN: [id number]  [address] [facility] 1940  Date of evaluation: 9/12/2023  Provider: Andrea Palacios       Chief Complaint   Patient presents with    Clavicle pain         HISTORY OF PRESENT ILLNESS   (Location/Symptom, Timing/Onset, Context/Setting, Quality, Duration, Modifying Factors, Severity)  Note limiting factors. The patient is an 71-year-old female history of GERD, diverticulitis, depression, hyperlipidemia, hypertension and JUAN presenting today with left upper chest pain. She describes sudden onset of pain to the left upper chest/clavicle region that started several hours ago. She called the 's office because she did not want to call 911 and they sent out an ambulance anyway. No shortness of breath with it. There is a sharp pain that lasted until just before she got here. Right now she is completely asymptomatic. It was not exertional or pleuritic. She does report that she was working outside in the yard yesterday which is atypical for her. No numbness or weakness. No headache or dizziness. No sweatiness or fever. No other complaints at this time. Review of External Medical Records:     Nursing Notes were reviewed. REVIEW OF SYSTEMS    (2-9 systems for level 4, 10 or more for level 5)     Review of Systems   Constitutional:  Negative for fever. Respiratory:  Negative for shortness of breath. Cardiovascular:  Positive for chest pain. Gastrointestinal:  Negative for abdominal pain. Neurological:  Negative for weakness and numbness. Except as noted above the remainder of the review of systems was reviewed and negative.        PAST MEDICAL HISTORY     Past Medical History:   Diagnosis Date    Depression     Diverticulitis     GERD (gastroesophageal reflux disease)     Hiatal hernia     High cholesterol     Hypertension     Sleep apnea     uses CPAP

## 2023-09-13 NOTE — ED NOTES
Discharge instructions signed by patient; denies further questions. Patient refused wheelchair.       Aric Messina RN  09/12/23 1662

## 2024-01-09 ENCOUNTER — HOSPITAL ENCOUNTER (OUTPATIENT)
Facility: HOSPITAL | Age: 84
Discharge: HOME | End: 2024-01-12
Payer: MEDICARE

## 2024-01-09 DIAGNOSIS — R10.9 LEFT LATERAL ABDOMINAL PAIN: ICD-10-CM

## 2024-01-09 PROCEDURE — 6360000004 HC RX CONTRAST MEDICATION: Performed by: FAMILY MEDICINE

## 2024-01-09 PROCEDURE — 74177 CT ABD & PELVIS W/CONTRAST: CPT

## 2024-01-09 RX ADMIN — IOPAMIDOL 100 ML: 755 INJECTION, SOLUTION INTRAVENOUS at 08:34

## 2024-05-07 ENCOUNTER — APPOINTMENT (OUTPATIENT)
Facility: HOSPITAL | Age: 84
DRG: 392 | End: 2024-05-07
Payer: MEDICARE

## 2024-05-07 ENCOUNTER — HOSPITAL ENCOUNTER (INPATIENT)
Facility: HOSPITAL | Age: 84
LOS: 2 days | Discharge: HOME | DRG: 392 | End: 2024-05-09
Attending: STUDENT IN AN ORGANIZED HEALTH CARE EDUCATION/TRAINING PROGRAM | Admitting: INTERNAL MEDICINE
Payer: MEDICARE

## 2024-05-07 DIAGNOSIS — E87.20 LACTIC ACIDOSIS: ICD-10-CM

## 2024-05-07 DIAGNOSIS — K52.9 GASTROENTERITIS: Primary | ICD-10-CM

## 2024-05-07 PROBLEM — Z87.19 HX OF SMALL BOWEL OBSTRUCTION: Status: ACTIVE | Noted: 2024-05-07

## 2024-05-07 PROBLEM — E66.9 OBESE: Status: ACTIVE | Noted: 2024-05-07

## 2024-05-07 PROBLEM — R10.9 ABDOMINAL PAIN: Status: ACTIVE | Noted: 2024-05-07

## 2024-05-07 PROBLEM — K56.609 SMALL BOWEL OBSTRUCTION (HCC): Status: RESOLVED | Noted: 2019-02-13 | Resolved: 2024-05-07

## 2024-05-07 PROBLEM — K44.9 HIATAL HERNIA: Status: ACTIVE | Noted: 2024-05-07

## 2024-05-07 PROBLEM — R11.2 NAUSEA VOMITING AND DIARRHEA: Status: ACTIVE | Noted: 2024-05-07

## 2024-05-07 PROBLEM — R19.7 NAUSEA VOMITING AND DIARRHEA: Status: ACTIVE | Noted: 2024-05-07

## 2024-05-07 PROBLEM — G47.33 OSA ON CPAP: Status: ACTIVE | Noted: 2024-05-07

## 2024-05-07 PROBLEM — K57.90 DIVERTICULOSIS: Status: ACTIVE | Noted: 2024-05-07

## 2024-05-07 PROBLEM — D72.829 LEUKOCYTOSIS: Status: ACTIVE | Noted: 2024-05-07

## 2024-05-07 PROBLEM — I10 HYPERTENSION: Status: ACTIVE | Noted: 2024-05-07

## 2024-05-07 PROBLEM — K21.9 GERD (GASTROESOPHAGEAL REFLUX DISEASE): Status: ACTIVE | Noted: 2024-05-07

## 2024-05-07 LAB
ALBUMIN SERPL-MCNC: 3.8 G/DL (ref 3.5–5)
ALBUMIN/GLOB SERPL: 1.2 (ref 1.1–2.2)
ALP SERPL-CCNC: 76 U/L (ref 45–117)
ALT SERPL-CCNC: 31 U/L (ref 12–78)
ANION GAP BLD CALC-SCNC: 12 (ref 10–20)
ANION GAP BLD CALC-SCNC: 13 (ref 10–20)
ANION GAP SERPL CALC-SCNC: 13 MMOL/L (ref 5–15)
APPEARANCE UR: CLEAR
AST SERPL-CCNC: 19 U/L (ref 15–37)
BACTERIA URNS QL MICRO: NEGATIVE /HPF
BASOPHILS # BLD: 0 K/UL (ref 0–0.1)
BASOPHILS NFR BLD: 0 % (ref 0–1)
BILIRUB SERPL-MCNC: 1 MG/DL (ref 0.2–1)
BILIRUB UR QL: NEGATIVE
BUN SERPL-MCNC: 25 MG/DL (ref 6–20)
BUN/CREAT SERPL: 22 (ref 12–20)
C DIFF GDH STL QL: NEGATIVE
C DIFF TOX A+B STL QL IA: NEGATIVE
C DIFF TOXIN INTERPRETATION: NORMAL
CA-I BLD-MCNC: 1.07 MMOL/L (ref 1.12–1.32)
CA-I BLD-MCNC: 1.1 MMOL/L (ref 1.12–1.32)
CALCIUM SERPL-MCNC: 8.8 MG/DL (ref 8.5–10.1)
CHLORIDE BLD-SCNC: 105 MMOL/L (ref 100–108)
CHLORIDE BLD-SCNC: 107 MMOL/L (ref 100–108)
CHLORIDE SERPL-SCNC: 104 MMOL/L (ref 97–108)
CO2 BLD-SCNC: 23 MMOL/L (ref 19–24)
CO2 BLD-SCNC: 26 MMOL/L (ref 19–24)
CO2 SERPL-SCNC: 25 MMOL/L (ref 21–32)
COLOR UR: ABNORMAL
COMMENT:: NORMAL
CREAT SERPL-MCNC: 1.13 MG/DL (ref 0.55–1.02)
CREAT UR-MCNC: 0.7 MG/DL (ref 0.6–1.3)
CREAT UR-MCNC: 0.7 MG/DL (ref 0.6–1.3)
DIFFERENTIAL METHOD BLD: ABNORMAL
EOSINOPHIL # BLD: 0 K/UL (ref 0–0.4)
EOSINOPHIL NFR BLD: 0 % (ref 0–7)
EPITH CASTS URNS QL MICRO: ABNORMAL /LPF
ERYTHROCYTE [DISTWIDTH] IN BLOOD BY AUTOMATED COUNT: 12 % (ref 11.5–14.5)
GLOBULIN SER CALC-MCNC: 3.1 G/DL (ref 2–4)
GLUCOSE BLD STRIP.AUTO-MCNC: 130 MG/DL (ref 74–106)
GLUCOSE BLD STRIP.AUTO-MCNC: 183 MG/DL (ref 74–106)
GLUCOSE SERPL-MCNC: 183 MG/DL (ref 65–100)
GLUCOSE UR STRIP.AUTO-MCNC: 100 MG/DL
HCO3 BLDA-SCNC: 24 MMOL/L
HCT VFR BLD AUTO: 44 % (ref 35–47)
HGB BLD-MCNC: 15 G/DL (ref 11.5–16)
HGB UR QL STRIP: ABNORMAL
IMM GRANULOCYTES # BLD AUTO: 0 K/UL (ref 0–0.04)
IMM GRANULOCYTES NFR BLD AUTO: 0 % (ref 0–0.5)
KETONES UR QL STRIP.AUTO: ABNORMAL MG/DL
LACTATE BLD-SCNC: 4.45 MMOL/L (ref 0.4–2)
LACTATE BLD-SCNC: 4.49 MMOL/L (ref 0.4–2)
LEUKOCYTE ESTERASE UR QL STRIP.AUTO: NEGATIVE
LIPASE SERPL-CCNC: 22 U/L (ref 13–75)
LYMPHOCYTES # BLD: 0.5 K/UL (ref 0.8–3.5)
LYMPHOCYTES NFR BLD: 3 % (ref 12–49)
MAGNESIUM SERPL-MCNC: 2 MG/DL (ref 1.6–2.4)
MCH RBC QN AUTO: 31.1 PG (ref 26–34)
MCHC RBC AUTO-ENTMCNC: 34.1 G/DL (ref 30–36.5)
MCV RBC AUTO: 91.3 FL (ref 80–99)
MONOCYTES # BLD: 0.8 K/UL (ref 0–1)
MONOCYTES NFR BLD: 5 % (ref 5–13)
NEUTS SEG # BLD: 14.8 K/UL (ref 1.8–8)
NEUTS SEG NFR BLD: 92 % (ref 32–75)
NITRITE UR QL STRIP.AUTO: NEGATIVE
NRBC # BLD: 0 K/UL (ref 0–0.01)
NRBC BLD-RTO: 0 PER 100 WBC
PCO2 BLDV: 31.2 MMHG (ref 41–51)
PH BLDV: 7.49 (ref 7.32–7.42)
PH UR STRIP: 7 (ref 5–8)
PHOSPHATE SERPL-MCNC: 2.9 MG/DL (ref 2.6–4.7)
PLATELET # BLD AUTO: 198 K/UL (ref 150–400)
PMV BLD AUTO: 10.1 FL (ref 8.9–12.9)
PO2 BLDV: 39 MMHG (ref 25–40)
POTASSIUM BLD-SCNC: 3.6 MMOL/L (ref 3.5–5.5)
POTASSIUM BLD-SCNC: 3.7 MMOL/L (ref 3.5–5.5)
POTASSIUM SERPL-SCNC: 3.9 MMOL/L (ref 3.5–5.1)
PROCALCITONIN SERPL-MCNC: 0.34 NG/ML
PROT SERPL-MCNC: 6.9 G/DL (ref 6.4–8.2)
PROT UR STRIP-MCNC: NEGATIVE MG/DL
RBC # BLD AUTO: 4.82 M/UL (ref 3.8–5.2)
RBC #/AREA URNS HPF: ABNORMAL /HPF (ref 0–5)
RBC MORPH BLD: ABNORMAL
SAO2 % BLD: 79 %
SERVICE CMNT-IMP: 8
SERVICE CMNT-IMP: ABNORMAL
SODIUM BLD-SCNC: 143 MMOL/L (ref 136–145)
SODIUM BLD-SCNC: 143 MMOL/L (ref 136–145)
SODIUM SERPL-SCNC: 142 MMOL/L (ref 136–145)
SP GR UR REFRACTOMETRY: 1.01 (ref 1–1.03)
SPECIMEN HOLD: NORMAL
SPECIMEN SITE: ABNORMAL
SPECIMEN SITE: ABNORMAL
TROPONIN I SERPL HS-MCNC: 8 NG/L (ref 0–51)
UROBILINOGEN UR QL STRIP.AUTO: 0.2 EU/DL (ref 0.2–1)
WBC # BLD AUTO: 16.1 K/UL (ref 3.6–11)
WBC URNS QL MICRO: ABNORMAL /HPF (ref 0–4)

## 2024-05-07 PROCEDURE — 6360000002 HC RX W HCPCS: Performed by: INTERNAL MEDICINE

## 2024-05-07 PROCEDURE — 6370000000 HC RX 637 (ALT 250 FOR IP): Performed by: INTERNAL MEDICINE

## 2024-05-07 PROCEDURE — 84100 ASSAY OF PHOSPHORUS: CPT

## 2024-05-07 PROCEDURE — 99285 EMERGENCY DEPT VISIT HI MDM: CPT

## 2024-05-07 PROCEDURE — 2500000003 HC RX 250 WO HCPCS: Performed by: INTERNAL MEDICINE

## 2024-05-07 PROCEDURE — 93005 ELECTROCARDIOGRAM TRACING: CPT | Performed by: STUDENT IN AN ORGANIZED HEALTH CARE EDUCATION/TRAINING PROGRAM

## 2024-05-07 PROCEDURE — 83605 ASSAY OF LACTIC ACID: CPT

## 2024-05-07 PROCEDURE — 2580000003 HC RX 258: Performed by: INTERNAL MEDICINE

## 2024-05-07 PROCEDURE — 36415 COLL VENOUS BLD VENIPUNCTURE: CPT

## 2024-05-07 PROCEDURE — 83690 ASSAY OF LIPASE: CPT

## 2024-05-07 PROCEDURE — 76705 ECHO EXAM OF ABDOMEN: CPT

## 2024-05-07 PROCEDURE — 97161 PT EVAL LOW COMPLEX 20 MIN: CPT

## 2024-05-07 PROCEDURE — 82330 ASSAY OF CALCIUM: CPT

## 2024-05-07 PROCEDURE — 96361 HYDRATE IV INFUSION ADD-ON: CPT

## 2024-05-07 PROCEDURE — 80053 COMPREHEN METABOLIC PANEL: CPT

## 2024-05-07 PROCEDURE — 2580000003 HC RX 258: Performed by: STUDENT IN AN ORGANIZED HEALTH CARE EDUCATION/TRAINING PROGRAM

## 2024-05-07 PROCEDURE — 84145 PROCALCITONIN (PCT): CPT

## 2024-05-07 PROCEDURE — 84132 ASSAY OF SERUM POTASSIUM: CPT

## 2024-05-07 PROCEDURE — 96365 THER/PROPH/DIAG IV INF INIT: CPT

## 2024-05-07 PROCEDURE — 80047 BASIC METABLC PNL IONIZED CA: CPT

## 2024-05-07 PROCEDURE — 84484 ASSAY OF TROPONIN QUANT: CPT

## 2024-05-07 PROCEDURE — 87324 CLOSTRIDIUM AG IA: CPT

## 2024-05-07 PROCEDURE — 96375 TX/PRO/DX INJ NEW DRUG ADDON: CPT

## 2024-05-07 PROCEDURE — 94761 N-INVAS EAR/PLS OXIMETRY MLT: CPT

## 2024-05-07 PROCEDURE — 74177 CT ABD & PELVIS W/CONTRAST: CPT

## 2024-05-07 PROCEDURE — 81001 URINALYSIS AUTO W/SCOPE: CPT

## 2024-05-07 PROCEDURE — 1100000000 HC RM PRIVATE

## 2024-05-07 PROCEDURE — 84295 ASSAY OF SERUM SODIUM: CPT

## 2024-05-07 PROCEDURE — 82803 BLOOD GASES ANY COMBINATION: CPT

## 2024-05-07 PROCEDURE — 97165 OT EVAL LOW COMPLEX 30 MIN: CPT

## 2024-05-07 PROCEDURE — 97116 GAIT TRAINING THERAPY: CPT

## 2024-05-07 PROCEDURE — C9113 INJ PANTOPRAZOLE SODIUM, VIA: HCPCS | Performed by: INTERNAL MEDICINE

## 2024-05-07 PROCEDURE — A4216 STERILE WATER/SALINE, 10 ML: HCPCS | Performed by: INTERNAL MEDICINE

## 2024-05-07 PROCEDURE — 83735 ASSAY OF MAGNESIUM: CPT

## 2024-05-07 PROCEDURE — 87506 IADNA-DNA/RNA PROBE TQ 6-11: CPT

## 2024-05-07 PROCEDURE — 87449 NOS EACH ORGANISM AG IA: CPT

## 2024-05-07 PROCEDURE — 85025 COMPLETE CBC W/AUTO DIFF WBC: CPT

## 2024-05-07 PROCEDURE — 97535 SELF CARE MNGMENT TRAINING: CPT

## 2024-05-07 PROCEDURE — 6360000002 HC RX W HCPCS: Performed by: STUDENT IN AN ORGANIZED HEALTH CARE EDUCATION/TRAINING PROGRAM

## 2024-05-07 PROCEDURE — 82947 ASSAY GLUCOSE BLOOD QUANT: CPT

## 2024-05-07 PROCEDURE — 6360000004 HC RX CONTRAST MEDICATION: Performed by: STUDENT IN AN ORGANIZED HEALTH CARE EDUCATION/TRAINING PROGRAM

## 2024-05-07 RX ORDER — SODIUM CHLORIDE 0.9 % (FLUSH) 0.9 %
5-40 SYRINGE (ML) INJECTION EVERY 12 HOURS SCHEDULED
Status: DISCONTINUED | OUTPATIENT
Start: 2024-05-07 | End: 2024-05-09 | Stop reason: HOSPADM

## 2024-05-07 RX ORDER — SODIUM CHLORIDE 9 MG/ML
INJECTION, SOLUTION INTRAVENOUS PRN
Status: DISCONTINUED | OUTPATIENT
Start: 2024-05-07 | End: 2024-05-09 | Stop reason: HOSPADM

## 2024-05-07 RX ORDER — BUPROPION HYDROCHLORIDE 150 MG/1
150 TABLET ORAL DAILY
Status: DISCONTINUED | OUTPATIENT
Start: 2024-05-07 | End: 2024-05-09 | Stop reason: HOSPADM

## 2024-05-07 RX ORDER — FLUOXETINE 20 MG/5ML
5 SOLUTION ORAL DAILY
Status: DISCONTINUED | OUTPATIENT
Start: 2024-05-07 | End: 2024-05-09 | Stop reason: HOSPADM

## 2024-05-07 RX ORDER — ONDANSETRON 4 MG/1
4 TABLET, ORALLY DISINTEGRATING ORAL EVERY 8 HOURS PRN
Status: DISCONTINUED | OUTPATIENT
Start: 2024-05-07 | End: 2024-05-09 | Stop reason: HOSPADM

## 2024-05-07 RX ORDER — METRONIDAZOLE 500 MG/100ML
500 INJECTION, SOLUTION INTRAVENOUS EVERY 12 HOURS
Status: DISCONTINUED | OUTPATIENT
Start: 2024-05-07 | End: 2024-05-08

## 2024-05-07 RX ORDER — SODIUM CHLORIDE, SODIUM LACTATE, POTASSIUM CHLORIDE, AND CALCIUM CHLORIDE .6; .31; .03; .02 G/100ML; G/100ML; G/100ML; G/100ML
1000 INJECTION, SOLUTION INTRAVENOUS
Status: COMPLETED | OUTPATIENT
Start: 2024-05-07 | End: 2024-05-07

## 2024-05-07 RX ORDER — ACETAMINOPHEN 650 MG/1
650 SUPPOSITORY RECTAL EVERY 6 HOURS PRN
Status: DISCONTINUED | OUTPATIENT
Start: 2024-05-07 | End: 2024-05-07

## 2024-05-07 RX ORDER — SODIUM CHLORIDE 9 MG/ML
INJECTION, SOLUTION INTRAVENOUS CONTINUOUS
Status: DISCONTINUED | OUTPATIENT
Start: 2024-05-07 | End: 2024-05-07

## 2024-05-07 RX ORDER — ONDANSETRON 2 MG/ML
4 INJECTION INTRAMUSCULAR; INTRAVENOUS ONCE
Status: COMPLETED | OUTPATIENT
Start: 2024-05-07 | End: 2024-05-07

## 2024-05-07 RX ORDER — LACTOBACILLUS RHAMNOSUS GG 10B CELL
1 CAPSULE ORAL
Status: DISCONTINUED | OUTPATIENT
Start: 2024-05-07 | End: 2024-05-09 | Stop reason: HOSPADM

## 2024-05-07 RX ORDER — ACETAMINOPHEN 325 MG/1
650 TABLET ORAL EVERY 6 HOURS PRN
Status: DISCONTINUED | OUTPATIENT
Start: 2024-05-07 | End: 2024-05-09 | Stop reason: HOSPADM

## 2024-05-07 RX ORDER — SODIUM CHLORIDE 0.9 % (FLUSH) 0.9 %
5-40 SYRINGE (ML) INJECTION PRN
Status: DISCONTINUED | OUTPATIENT
Start: 2024-05-07 | End: 2024-05-09 | Stop reason: HOSPADM

## 2024-05-07 RX ORDER — MORPHINE SULFATE 4 MG/ML
4 INJECTION, SOLUTION INTRAMUSCULAR; INTRAVENOUS
Status: DISPENSED | OUTPATIENT
Start: 2024-05-07 | End: 2024-05-07

## 2024-05-07 RX ORDER — ENOXAPARIN SODIUM 100 MG/ML
40 INJECTION SUBCUTANEOUS DAILY
Status: DISCONTINUED | OUTPATIENT
Start: 2024-05-07 | End: 2024-05-09 | Stop reason: HOSPADM

## 2024-05-07 RX ORDER — POLYETHYLENE GLYCOL 3350 17 G/17G
17 POWDER, FOR SOLUTION ORAL DAILY PRN
Status: DISCONTINUED | OUTPATIENT
Start: 2024-05-07 | End: 2024-05-09 | Stop reason: HOSPADM

## 2024-05-07 RX ORDER — SERTRALINE HYDROCHLORIDE 25 MG/1
100 TABLET, FILM COATED ORAL DAILY
Status: DISCONTINUED | OUTPATIENT
Start: 2024-05-07 | End: 2024-05-07

## 2024-05-07 RX ORDER — ONDANSETRON 2 MG/ML
4 INJECTION INTRAMUSCULAR; INTRAVENOUS EVERY 6 HOURS PRN
Status: DISCONTINUED | OUTPATIENT
Start: 2024-05-07 | End: 2024-05-09 | Stop reason: HOSPADM

## 2024-05-07 RX ORDER — METRONIDAZOLE 500 MG/100ML
500 INJECTION, SOLUTION INTRAVENOUS ONCE
Status: COMPLETED | OUTPATIENT
Start: 2024-05-07 | End: 2024-05-07

## 2024-05-07 RX ORDER — FLUOXETINE 10 MG/1
10 CAPSULE ORAL DAILY
Status: DISCONTINUED | OUTPATIENT
Start: 2024-05-07 | End: 2024-05-07

## 2024-05-07 RX ORDER — DEXTROSE MONOHYDRATE, SODIUM CHLORIDE, AND POTASSIUM CHLORIDE 50; 2.98; 4.5 G/1000ML; G/1000ML; G/1000ML
INJECTION, SOLUTION INTRAVENOUS CONTINUOUS
Status: DISCONTINUED | OUTPATIENT
Start: 2024-05-07 | End: 2024-05-08

## 2024-05-07 RX ADMIN — IOPAMIDOL 100 ML: 755 INJECTION, SOLUTION INTRAVENOUS at 02:36

## 2024-05-07 RX ADMIN — FLUOXETINE HYDROCHLORIDE 5.2 MG: 20 LIQUID ORAL at 17:47

## 2024-05-07 RX ADMIN — SODIUM CHLORIDE, PRESERVATIVE FREE 40 MG: 5 INJECTION INTRAVENOUS at 09:14

## 2024-05-07 RX ADMIN — Medication 1 CAPSULE: at 09:14

## 2024-05-07 RX ADMIN — POTASSIUM CHLORIDE, DEXTROSE MONOHYDRATE AND SODIUM CHLORIDE: 300; 5; 450 INJECTION, SOLUTION INTRAVENOUS at 17:52

## 2024-05-07 RX ADMIN — SODIUM CHLORIDE: 9 INJECTION, SOLUTION INTRAVENOUS at 06:54

## 2024-05-07 RX ADMIN — BUPROPION HYDROCHLORIDE 150 MG: 150 TABLET, EXTENDED RELEASE ORAL at 13:58

## 2024-05-07 RX ADMIN — POTASSIUM CHLORIDE, DEXTROSE MONOHYDRATE AND SODIUM CHLORIDE: 300; 5; 450 INJECTION, SOLUTION INTRAVENOUS at 09:10

## 2024-05-07 RX ADMIN — METRONIDAZOLE 500 MG: 500 INJECTION, SOLUTION INTRAVENOUS at 17:53

## 2024-05-07 RX ADMIN — CEFTRIAXONE 1000 MG: 1 INJECTION, POWDER, FOR SOLUTION INTRAMUSCULAR; INTRAVENOUS at 04:24

## 2024-05-07 RX ADMIN — ONDANSETRON 4 MG: 2 INJECTION INTRAMUSCULAR; INTRAVENOUS at 02:14

## 2024-05-07 RX ADMIN — ENOXAPARIN SODIUM 40 MG: 100 INJECTION SUBCUTANEOUS at 09:14

## 2024-05-07 RX ADMIN — SODIUM CHLORIDE, POTASSIUM CHLORIDE, SODIUM LACTATE AND CALCIUM CHLORIDE 1000 ML: 600; 310; 30; 20 INJECTION, SOLUTION INTRAVENOUS at 02:16

## 2024-05-07 RX ADMIN — SODIUM CHLORIDE, PRESERVATIVE FREE 40 MG: 5 INJECTION INTRAVENOUS at 21:26

## 2024-05-07 RX ADMIN — ACETAMINOPHEN 650 MG: 325 TABLET ORAL at 17:52

## 2024-05-07 RX ADMIN — SODIUM CHLORIDE, PRESERVATIVE FREE 10 ML: 5 INJECTION INTRAVENOUS at 09:14

## 2024-05-07 RX ADMIN — SODIUM CHLORIDE: 9 INJECTION, SOLUTION INTRAVENOUS at 05:42

## 2024-05-07 RX ADMIN — SODIUM CHLORIDE, POTASSIUM CHLORIDE, SODIUM LACTATE AND CALCIUM CHLORIDE 1000 ML: 600; 310; 30; 20 INJECTION, SOLUTION INTRAVENOUS at 04:29

## 2024-05-07 RX ADMIN — SODIUM CHLORIDE, PRESERVATIVE FREE 10 ML: 5 INJECTION INTRAVENOUS at 21:32

## 2024-05-07 RX ADMIN — METRONIDAZOLE 500 MG: 500 INJECTION, SOLUTION INTRAVENOUS at 04:29

## 2024-05-07 ASSESSMENT — PAIN DESCRIPTION - FREQUENCY: FREQUENCY: CONTINUOUS

## 2024-05-07 ASSESSMENT — PAIN DESCRIPTION - ORIENTATION: ORIENTATION: RIGHT;LEFT;UPPER

## 2024-05-07 ASSESSMENT — PAIN DESCRIPTION - PAIN TYPE: TYPE: ACUTE PAIN

## 2024-05-07 ASSESSMENT — PAIN SCALES - GENERAL
PAINLEVEL_OUTOF10: 7
PAINLEVEL_OUTOF10: 3

## 2024-05-07 ASSESSMENT — PAIN - FUNCTIONAL ASSESSMENT: PAIN_FUNCTIONAL_ASSESSMENT: 0-10

## 2024-05-07 ASSESSMENT — PAIN DESCRIPTION - LOCATION: LOCATION: ABDOMEN

## 2024-05-07 ASSESSMENT — PAIN DESCRIPTION - DESCRIPTORS: DESCRIPTORS: ACHING

## 2024-05-07 NOTE — CARE COORDINATION
5/7/2024  1:26 PM      ICD-10-CM    1. Gastroenteritis  K52.9       2. Lactic acidosis  E87.20         PT eval, no needs noted for discharge.    CM reviewed EMR. No CM needs indicated at this time. Providers, if needs arise, please consult case management.     Roxana Mcclendon RN, [facility]

## 2024-05-07 NOTE — ED NOTES
TRANSFER - OUT REPORT:    Verbal report given to ivette Hopper on Maria Dolores Brunson  being transferred to [facility]Critical access hospital for routine progression of patient care       Report consisted of patient's Situation, Background, Assessment and   Recommendations(SBAR).     Information from the following report(s) Nurse Handoff Report, Index, ED Encounter Summary, ED SBAR, MAR, and Recent Results was reviewed with the receiving nurse.    [place] Fall Assessment:    Presents to emergency department  because of falls (Syncope, seizure, or loss of consciousness): No  Age > 70: Yes  Altered Mental Status, Intoxication with alcohol or substance confusion (Disorientation, impaired judgment, poor safety awaremess, or inability to follow instructions): No  Impaired Mobility: Ambulates or transfers with assistive devices or assistance; Unable to ambulate or transer.: No  Nursing Judgement: Yes          Lines:   Peripheral IV 05/07/24 Right Antecubital (Active)        Opportunity for questions and clarification was provided.      Patient transported with:  Monitor, 20g in rt AC with normal saline infusing at 125ml/hr.

## 2024-05-07 NOTE — PROGRESS NOTES
House  Home Layout: One level  Home Access: Stairs to enter with rails  Entrance Stairs - Number of Steps: 5  Entrance Stairs - Rails: Both  Bathroom Toilet: Handicap height  Bathroom Equipment: Grab bars in shower, Shower chair  Home Equipment:  (\"walking stick\")  Critical Behavior:          Hearing:        Vision/Perceptual:                  Strength:    Strength: Within functional limits    Tone & Sensation:   Tone: Normal  Sensation: Intact    Coordination:       Range Of Motion:  AROM: Within functional limits       Functional Mobility:  Bed Mobility:     Bed Mobility Training  Bed Mobility Training: Yes  Overall Level of Assistance: Independent  Interventions: Verbal cues  Rolling: Independent  Supine to Sit: Independent  Transfers:     Transfer Training  Transfer Training: Yes  Overall Level of Assistance: Independent  Interventions: Verbal cues  Sit to Stand: Modified independent  Balance:               Balance  Sitting: Intact  Standing: Intact  Ambulation/Gait Training:                       Gait  Gait Training: Yes  Overall Level of Assistance: Independent  Distance (ft): 25 Feet (x3 laps in room)  Assistive Device: Gait belt                                                                                                                                                                                                                                                        [facility] AM-PAC®      Basic Mobility Inpatient Short Form (6-Clicks) Version 2  How much HELP from another person do you currently need... (If the patient hasn't done an activity recently, how much help from another person do you think they would need if they tried?) Total A Lot A Little None   1.  Turning from your back to your side while in a flat bed without using bedrails? []  1 []  2 []  3  [x]  4   2.  Moving from lying on your back to sitting on the side of a flat bed without using bedrails? []  1 []  2 []  3  [x]  4   3.   Moving to and from a bed to a chair (including a wheelchair)? []  1 []  2 []  3  [x]  4   4. Standing up from a chair using your arms (e.g. wheelchair or bedside chair)? []  1 []  2 []  3  [x]  4   5.  Walking in hospital room? []  1 []  2 []  3  [x]  4   6.  Climbing 3-5 steps with a railing? []  1 []  2 []  3  [x]  4     Raw Score: 24/24                            Cutoff score ?171,2,3 had higher odds of discharging home with home health or need of SNF/IPR.    1. Sonya Torrez, Em Chu, Rigo Daley, Cecilia Mcclure, Codey Spann, Colten Torrez.  Validity of the AM-PAC “6-Clicks” Inpatient Daily Activity and Basic Mobility Short Forms. Physical Therapy Mar 2014, 94 3) 379-391; DOI: 10.2522/ptj.25542804  2. Ian BARR, Cuco J, Omero J, Lou J. Association of AM-PAC \"6-Clicks\" Basic Mobility and Daily Activity Scores With Discharge Destination. Phys Ther. 2021 Apr 4;101(4):zkyb317. doi: 10.1093/ptj/cigo494. PMID: 63020169.  3. Mary ADLER, Radha D, Anna S, Samson K, Abhinav S. Activity Measure for Post-Acute Care \"6-Clicks\" Basic Mobility Scores Predict Discharge Destination After Acute Care Hospitalization in [facility] Patient Groups: A Retrospective, Observational Study. Arch Rehabil Res Clin Transl. 2022 Jul 16;4(3):475259. doi: 10.1016/j.arrct.2022.242476. PMID: 65948776; PMCID: QKO3091525.  4. Lore GRAJEDA, Dee S, Nettie W, Minnie P. AM-PAC Short Forms Manual 4.0. Revised 2/2020.                                                                                                                                                                                                                                  Activity Tolerance:   Good    After treatment:   Patient left in no apparent distress sitting up in chair and Call bell within reach      COMMUNICATION/EDUCATION:   The patient's plan of care was discussed with: registered nurse    Patient Education  Education Given To: Patient  Education

## 2024-05-07 NOTE — H&P
medications    Medication Sig Start Date End Date Taking? Authorizing Provider   FLUoxetine (PROZAC) 10 MG capsule TAKE 1 CAPSULE BY MOUTH ONCE A DAY 9/8/23   Herminia Elizalde MD   aspirin 81 MG EC tablet Take 1 tablet by mouth daily    Herminia Elizalde MD   Multiple Vitamins-Minerals (THERAPEUTIC MULTIVITAMIN-MINERALS) tablet Take 1 tablet by mouth daily    Herminia Elizalde MD   meloxicam (MOBIC) 7.5 MG tablet Take 1 tablet by mouth daily    Herminia Elizalde MD   acetaminophen (TYLENOL) 500 MG tablet Take 2 tablets by mouth every 6 hours as needed    Automatic Reconciliation, Ar   amLODIPine (NORVASC) 5 MG tablet Take 1 tablet by mouth daily    Automatic Reconciliation, Ar   buPROPion (WELLBUTRIN XL) 150 MG extended release tablet Take 1 tablet by mouth    Automatic Reconciliation, Ar   Cholecalciferol 50 MCG (2000 UT) CAPS Take by mouth daily    Automatic Reconciliation, Ar   dicyclomine (BENTYL) 10 MG capsule Take 20 mg by mouth 4 times daily  Patient not taking: Reported on 9/12/2023 10/27/20   Automatic Reconciliation, Ar   pravastatin (PRAVACHOL) 40 MG tablet Take 1 tablet by mouth nightly    Automatic Reconciliation, Ar   sertraline (ZOLOFT) 50 MG tablet Take 2 tablets by mouth daily    Automatic Reconciliation, Ar       Review of Systems:  (bold if positive, if negative)    Gen:  Eyes:  ENT:  CVS:  Pulm:  GI:  Abdominal pain, nausea, emesis, diarrhea,GERDGU:  MS:  Skin:  Psych:  Insomnia, depression, anxietyEndo:  Hem:  Renal:  Neuro:        Objective:      VITALS:    Vital signs reviewed; most recent are:    Vitals:    05/07/24 0515 05/07/24 0545 05/07/24 0600 05/07/24 0645   BP: (!) 105/41 (!) 111/58 119/67 (!) 112/55   Pulse: 78 78 84 75   Resp: 17 22 16 16   Temp:   98.1 °F (36.7 °C) 99.3 °F (37.4 °C)   TempSrc:   Oral Oral   SpO2: 93% 95% 95% 94%   Weight:    75.1 kg (165 lb 9.6 oz)   Height:          @qvnjndh2tlgcoke@       Intake/Output Summary (Last 24 hours) at 5/7/2024  0728  Last data filed at 5/7/2024 0533  Gross per 24 hour   Intake 2100 ml   Output --   Net 2100 ml        Exam:     Physical Exam:    Gen:  Obese, in no acute distress  HEENT:  Pink conjunctivae, PERRL, hearing intact to voice, moist mucous membranes  Neck:  Supple, without masses, thyroid non-tender  Resp:  No accessory muscle use, clear breath sounds without wheezes rales or rhonchi  Card:  No murmurs, normal S1, S2 without thrills, bruits or peripheral edema  Abd:  Soft, non-tender, non-distended, normoactive bowel sounds are present, no mass  Lymph:  No cervical or inguinal adenopathy  Musc:  No cyanosis or clubbing  Skin:  No rashes or ulcers, skin turgor is good  Neuro:  Cranial nerves are grossly intact, mild motor weakness, follows commands appropriately  Psych:  Good insight, oriented to person, place and time, alert     Labs:    Recent Labs     05/07/24 0211   WBC 16.1*   HGB 15.0   HCT 44.0        Recent Labs     05/07/24  0211 05/07/24  0218 05/07/24  0406     --   --    K 3.9  --   --      --   --    CO2 25  --   --    GLUCOSE 183*  --   --    BUN 25*  --   --    CREATININE 1.13* 0.7 0.7   CALCIUM 8.8  --   --    AST 19  --   --    ALT 31  --   --      Lab Results   Component Value Date/Time    GLUCOSE 183 05/07/2024 02:11 AM    GLUCOSE 153 09/12/2023 07:08 PM    GLUCOSE 97 06/12/2021 10:45 PM    GLUCOSE 102 10/27/2020 09:38 AM     No results for input(s): \"PH\", \"PCO2\", \"PO2\", \"HCO3\", \"FIO2\" in the last 72 hours.  No results for input(s): \"INR\" in the last 72 hours.  Results       Procedure Component Value Units Date/Time    Enteric Bact Panel, DNA [[id number]]     Order Status: Sent Specimen: Stool     Enteric Bact Panel, DNA [[id number]]     Order Status: Canceled Specimen: Stool     Clostridium Difficile Toxin/Antigen [[id number]]     Order Status: Sent Specimen: Stool             I have reviewed previous records       Assessment and Plan:      Gastroenteritis / Abdominal pain /

## 2024-05-07 NOTE — ED NOTES
[facility] at bedside for transport of pt to [facility]. Pt report, summary, and facesheet given to [facility] staff. Pt travels with 20g in rt AC with 125ml infusing (normal saline). Pt will travel on heart monitor.

## 2024-05-07 NOTE — ED PROVIDER NOTES
tobacco: Never   Substance and Sexual Activity    Alcohol use: No    Drug use: No       PHYSICAL EXAM    (up to 7 for level 4, 8 or more for level 5)     ED Triage Vitals [05/07/24 0222]   BP Temp Temp Source Pulse Respirations SpO2 Height Weight - Scale   (!) 153/84 98.1 °F (36.7 °C) Oral 85 16 98 % 1.549 m (5' 1\") 70.3 kg (155 lb)       Body mass index is 29.29 kg/m².    Physical Exam    See mdm    DIAGNOSTIC RESULTS     EKG: All EKG's are interpreted by the Emergency Department Physician who either signs or Co-signs this chart in the absence of a cardiologist.        RADIOLOGY:   Non-plain film images such as CT, Ultrasound and MRI are read by the radiologist.    Interpretation per the Radiologist below, if available at the time of this note:    US GALLBLADDER RUQ    (Results Pending)   CT ABDOMEN PELVIS W IV CONTRAST Additional Contrast? None    (Results Pending)        LABS:  Labs Reviewed   COMPREHENSIVE METABOLIC PANEL - Abnormal; Notable for the following components:       Result Value    Glucose 183 (*)     BUN 25 (*)     Creatinine 1.13 (*)     Bun/Cre Ratio 22 (*)     Est, Glom Filt Rate 49 (*)     All other components within normal limits   CBC WITH AUTO DIFFERENTIAL - Abnormal; Notable for the following components:    WBC 16.1 (*)     Neutrophils % 92 (*)     Lymphocytes % 3 (*)     Neutrophils Absolute 14.8 (*)     Lymphocytes Absolute 0.5 (*)     All other components within normal limits   POCT BLOOD GAS & ELECTROLYTES - Abnormal; Notable for the following components:    PH, VENOUS (POC) 7.49 (*)     PCO2, [place], POC 31.2 (*)     POC Glucose 183 (*)     POC Ionized Calcium 1.07 (*)     POC Lactic Acid 4.45 (*)     All other components within normal limits   LIPASE   EXTRA TUBES HOLD   URINALYSIS WITH MICROSCOPIC   LACTIC ACID   TROPONIN       All other labs were within normal range or not returned as of this dictation.        PROCEDURES:  Unless otherwise noted below, none     Procedures    See MDM

## 2024-05-07 NOTE — PROGRESS NOTES
OCCUPATIONAL THERAPY EVALUATION/DISCHARGE  Patient: Maria Dolores Brunson (83 y.o. female)  Date: 5/7/2024  Primary Diagnosis: Lactic acidosis [E87.20]  Gastroenteritis [K52.9]         Precautions:                    ASSESSMENT :  Based on the objective data below, the patient presents with independence with self care, mobility, and transfers. She reports feeling drastically improved compared to admission with functional activity tolerance demonstrated. Patient demonstrated entire HEP from previous therapy apt at bed level and seated EOB with good tolerance. Discussed home safety modifications and strategies for fall prevention in the home with good understanding and questions from patient throughout. Able to tolerate mobility without AD while managing own IV pole in the room without difficulty. No further acute OT needs identified at this time; please re-consult if change in status or further needs arise. Recommending home when medically ready.    Functional Outcome Measure:  The patient scored 24/24 on the [facility] outcome measure which is indicative of increased likelihood for need for SNF/IPR at discharge.      Further skilled acute occupational therapy is not indicated at this time.     PLAN :  Recommend with staff: Up ad angelic when unhooked from IV pole, ambulate in hallways 3x daily, OOB for all meals     Recommendation for discharge: (in order for the patient to meet his/her long term goals): No skilled occupational therapy    Other factors to consider for discharge: no additional factors    IF patient discharges home will need the following DME: none     SUBJECTIVE:   Patient stated, “.”    OBJECTIVE DATA SUMMARY:     Past Medical History:   Diagnosis Date    Depression     Diverticulosis     GERD (gastroesophageal reflux disease)     Hiatal hernia     High cholesterol     Hx of small bowel obstruction     Hypertension     Obese     JUAN on CPAP      Past Surgical History:   Procedure Laterality Date    CATARACT

## 2024-05-08 LAB
ALBUMIN SERPL-MCNC: 2.9 G/DL (ref 3.5–5)
ALBUMIN/GLOB SERPL: 1 (ref 1.1–2.2)
ALP SERPL-CCNC: 46 U/L (ref 45–117)
ALT SERPL-CCNC: 22 U/L (ref 12–78)
ANION GAP SERPL CALC-SCNC: 3 MMOL/L (ref 5–15)
AST SERPL-CCNC: 28 U/L (ref 15–37)
BILIRUB SERPL-MCNC: 0.8 MG/DL (ref 0.2–1)
BUN SERPL-MCNC: 12 MG/DL (ref 6–20)
BUN/CREAT SERPL: 12 (ref 12–20)
C COLI+JEJUNI TUF STL QL NAA+PROBE: NEGATIVE
CALCIUM SERPL-MCNC: 7.9 MG/DL (ref 8.5–10.1)
CHLORIDE SERPL-SCNC: 108 MMOL/L (ref 97–108)
CO2 SERPL-SCNC: 25 MMOL/L (ref 21–32)
CREAT SERPL-MCNC: 1.04 MG/DL (ref 0.55–1.02)
EC STX1+STX2 GENES STL QL NAA+PROBE: NEGATIVE
EKG ATRIAL RATE: 84 BPM
EKG DIAGNOSIS: NORMAL
EKG P AXIS: 24 DEGREES
EKG P-R INTERVAL: 158 MS
EKG Q-T INTERVAL: 398 MS
EKG QRS DURATION: 102 MS
EKG QTC CALCULATION (BAZETT): 470 MS
EKG R AXIS: -24 DEGREES
EKG T AXIS: 113 DEGREES
EKG VENTRICULAR RATE: 84 BPM
ERYTHROCYTE [DISTWIDTH] IN BLOOD BY AUTOMATED COUNT: 12.5 % (ref 11.5–14.5)
ETEC ELTA+ESTB GENES STL QL NAA+PROBE: NEGATIVE
GLOBULIN SER CALC-MCNC: 2.8 G/DL (ref 2–4)
GLUCOSE SERPL-MCNC: 117 MG/DL (ref 65–100)
HCT VFR BLD AUTO: 34.9 % (ref 35–47)
HGB BLD-MCNC: 11.7 G/DL (ref 11.5–16)
MAGNESIUM SERPL-MCNC: 1.9 MG/DL (ref 1.6–2.4)
MCH RBC QN AUTO: 30.3 PG (ref 26–34)
MCHC RBC AUTO-ENTMCNC: 33.5 G/DL (ref 30–36.5)
MCV RBC AUTO: 90.4 FL (ref 80–99)
NRBC # BLD: 0 K/UL (ref 0–0.01)
NRBC BLD-RTO: 0 PER 100 WBC
P SHIGELLOIDES DNA STL QL NAA+PROBE: NEGATIVE
PLATELET # BLD AUTO: 165 K/UL (ref 150–400)
PMV BLD AUTO: 10.5 FL (ref 8.9–12.9)
POTASSIUM SERPL-SCNC: 4.4 MMOL/L (ref 3.5–5.1)
PROT SERPL-MCNC: 5.7 G/DL (ref 6.4–8.2)
RBC # BLD AUTO: 3.86 M/UL (ref 3.8–5.2)
SALMONELLA SP SPAO STL QL NAA+PROBE: NEGATIVE
SHIGELLA SP+EIEC IPAH STL QL NAA+PROBE: NEGATIVE
SODIUM SERPL-SCNC: 136 MMOL/L (ref 136–145)
V CHOL+PARA+VUL DNA STL QL NAA+NON-PROBE: NEGATIVE
WBC # BLD AUTO: 7.4 K/UL (ref 3.6–11)
Y ENTEROCOL DNA STL QL NAA+NON-PROBE: NEGATIVE

## 2024-05-08 PROCEDURE — 2580000003 HC RX 258: Performed by: INTERNAL MEDICINE

## 2024-05-08 PROCEDURE — 80053 COMPREHEN METABOLIC PANEL: CPT

## 2024-05-08 PROCEDURE — 85027 COMPLETE CBC AUTOMATED: CPT

## 2024-05-08 PROCEDURE — 2500000003 HC RX 250 WO HCPCS: Performed by: INTERNAL MEDICINE

## 2024-05-08 PROCEDURE — 6370000000 HC RX 637 (ALT 250 FOR IP): Performed by: INTERNAL MEDICINE

## 2024-05-08 PROCEDURE — 83735 ASSAY OF MAGNESIUM: CPT

## 2024-05-08 PROCEDURE — 93010 ELECTROCARDIOGRAM REPORT: CPT | Performed by: INTERNAL MEDICINE

## 2024-05-08 PROCEDURE — 87040 BLOOD CULTURE FOR BACTERIA: CPT

## 2024-05-08 PROCEDURE — 1100000000 HC RM PRIVATE

## 2024-05-08 PROCEDURE — 36415 COLL VENOUS BLD VENIPUNCTURE: CPT

## 2024-05-08 PROCEDURE — 94761 N-INVAS EAR/PLS OXIMETRY MLT: CPT

## 2024-05-08 PROCEDURE — 6360000002 HC RX W HCPCS: Performed by: INTERNAL MEDICINE

## 2024-05-08 RX ORDER — AMOXICILLIN AND CLAVULANATE POTASSIUM 500; 125 MG/1; MG/1
1 TABLET, FILM COATED ORAL EVERY 8 HOURS SCHEDULED
Qty: 15 TABLET | Refills: 0 | Status: SHIPPED | OUTPATIENT
Start: 2024-05-08 | End: 2024-05-09 | Stop reason: HOSPADM

## 2024-05-08 RX ORDER — PANTOPRAZOLE SODIUM 40 MG/1
40 TABLET, DELAYED RELEASE ORAL
Status: DISCONTINUED | OUTPATIENT
Start: 2024-05-09 | End: 2024-05-09 | Stop reason: HOSPADM

## 2024-05-08 RX ORDER — AMOXICILLIN AND CLAVULANATE POTASSIUM 500; 125 MG/1; MG/1
1 TABLET, FILM COATED ORAL EVERY 8 HOURS SCHEDULED
Status: DISCONTINUED | OUTPATIENT
Start: 2024-05-08 | End: 2024-05-09 | Stop reason: HOSPADM

## 2024-05-08 RX ADMIN — FLUOXETINE HYDROCHLORIDE 5.2 MG: 20 LIQUID ORAL at 10:12

## 2024-05-08 RX ADMIN — AMOXICILLIN AND CLAVULANATE POTASSIUM 1 TABLET: 500; 125 TABLET, FILM COATED ORAL at 16:58

## 2024-05-08 RX ADMIN — Medication 1 CAPSULE: at 10:12

## 2024-05-08 RX ADMIN — AMOXICILLIN AND CLAVULANATE POTASSIUM 1 TABLET: 500; 125 TABLET, FILM COATED ORAL at 20:58

## 2024-05-08 RX ADMIN — POTASSIUM CHLORIDE, DEXTROSE MONOHYDRATE AND SODIUM CHLORIDE: 300; 5; 450 INJECTION, SOLUTION INTRAVENOUS at 03:46

## 2024-05-08 RX ADMIN — METRONIDAZOLE 500 MG: 500 INJECTION, SOLUTION INTRAVENOUS at 03:45

## 2024-05-08 RX ADMIN — BUPROPION HYDROCHLORIDE 150 MG: 150 TABLET, EXTENDED RELEASE ORAL at 10:12

## 2024-05-08 RX ADMIN — ACETAMINOPHEN 650 MG: 325 TABLET ORAL at 03:39

## 2024-05-08 RX ADMIN — SODIUM CHLORIDE, PRESERVATIVE FREE 10 ML: 5 INJECTION INTRAVENOUS at 20:58

## 2024-05-08 RX ADMIN — ACETAMINOPHEN 650 MG: 325 TABLET ORAL at 16:58

## 2024-05-08 RX ADMIN — WATER 1000 MG: 1 INJECTION INTRAMUSCULAR; INTRAVENOUS; SUBCUTANEOUS at 03:41

## 2024-05-08 ASSESSMENT — PAIN SCALES - GENERAL
PAINLEVEL_OUTOF10: 0
PAINLEVEL_OUTOF10: 7

## 2024-05-08 ASSESSMENT — PAIN DESCRIPTION - DESCRIPTORS: DESCRIPTORS: ACHING

## 2024-05-08 ASSESSMENT — PAIN DESCRIPTION - ORIENTATION: ORIENTATION: RIGHT;LEFT

## 2024-05-08 ASSESSMENT — PAIN DESCRIPTION - LOCATION: LOCATION: BACK

## 2024-05-08 NOTE — CARE COORDINATION
5/8/24  11:23 AM       05/08/24 1123   Discharge Planning   Type of Residence House   Patient expects to be discharged to: House   Services At/After Discharge   Mode of Transport at Discharge Other (see comment)  (Family)     No CM needs noted.    GENA Mccray  [facility]

## 2024-05-08 NOTE — DISCHARGE INSTRUCTIONS
antibiotics, take them as directed. Do not stop taking them just because you feel better. You need to take the full course of antibiotics.  Drink plenty of fluids to prevent dehydration. Choose water and other clear liquids until you feel better. If you have kidney, heart, or liver disease and have to limit fluids, talk with your doctor before you increase your fluid intake.  Drink fluids slowly, in frequent, small amounts, because drinking too much too fast can cause vomiting.  When you feel like eating, start with small amounts. Avoid spicy, hot, or high-fat foods, and do not drink alcohol or caffeine for a day or two. Do not drink milk or eat ice cream until you are feeling better.  How to prevent food poisoning  Keep your hands and your kitchen clean. Wash cutting boards and countertops often with hot, soapy water. Consider using disinfectant sprays or wipes on your counters.  Keep hot foods hot and cold foods cold.  Do not eat meats, dressings, salads, or other foods that have been kept at room temperature for more than 2 hours.  Use a thermometer to check your refrigerator. It should be between 34°F and 40°F.  Defrost meats in the refrigerator or microwave, not on the kitchen counter.  Cook meat until it is well done.  Do not eat raw eggs or uncooked sauces made with raw eggs.  Do not take chances. If food looks or tastes spoiled, throw it out.  Be extra careful when you travel. In some places, you may not want to drink water from the tap (including ice cubes) or eat any raw foods.  When should you call for help?   Call 911 anytime you think you may need emergency care. For example, call if:    You passed out (lost consciousness).     You have severe belly pain.     You vomit blood or what looks like coffee grounds.     Your stools are maroon or very bloody.   Call your doctor now or seek immediate medical care if:    You are dizzy or lightheaded, or feel like you may faint.     You have trouble breathing or  [phone].      Information obtained by :  I understand that if any problems occur once I am at home I am to contact my physician.    I understand and acknowledge receipt of the instructions indicated above.                                                                                                                                           Physician's or R.N.'s Signature                                                                  Date/Time                                                                                                                                              Patient or Representative Signature                                                          Date/Time

## 2024-05-08 NOTE — PROGRESS NOTES
To assist with discharge, I have completed the AVS Med-updates and added information on new medication to the AVS. Care Plans and Education were resolved. AVS has been printed and placed on the chart for the nurse.

## 2024-05-08 NOTE — PLAN OF CARE
Problem: Discharge Planning  Goal: Discharge to home or other facility with appropriate resources  5/8/2024 1431 by Zarina Nino, RN  Outcome: Completed     Problem: Safety - Adult  Goal: Free from fall injury  5/8/2024 1431 by Zarina Nino, RN  Outcome: Completed

## 2024-05-08 NOTE — PROGRESS NOTES
[facility]    Hospitalist Progress Note    NAME: Maria Dolores Brunson   :  1940  MRM:  [id number]    Date/Time of service 2024  8:09 AM    To assist coordination of care and communication with nursing and staff, this note may be preliminary early in the day, but finalized by end of the day.        Assessment and Plan:     Gastroenteritis / Abdominal pain / Fever / Leukocytosis / Lactic acidosis / Nausea vomiting and diarrhea / Gastroesophageal reflux disease / Hiatal hernia / Hx Diverticulosis / Hx of small bowel obstruction - POA, CT shows only \"mild infectious/inflammatory enterocolitis.\"  US of gallbladder normal.  Symptoms likely infectious, likely viral, but continue ceftriaxone and flagyl for now.  Not septic,, though fever 100.8 once last night. Hydrate with IVF.  Clear diet.  Probiotics. PPI.  Procalcitonin 0.36.  Negative enteric panel.     Anxiety and Depression - Continue fluoxetine and bupropion     Obese / JUAN on CPAP - Advise weight loss. No CPAP while acute vomiting risk.      Hypertension - POA, BP low, hold Norvasc for now     High cholesterol - Hold statin until eating       Subjective:     Chief Complaint:  nausea    ROS:  (bold if positive, if negative)    Tolerating PT  Tolerating some clear Diet        Objective:     Last 24hrs VS reviewed since prior progress note. Most recent are:    Vitals:    24 1927 24 0031 24 0421 24 0727   BP: (!) 102/54 (!) 120/57 (!) 111/55 114/60   Pulse: 70 78 80 68   Resp: 16 18 16 17   Temp: 99 °F (37.2 °C) 99.3 °F (37.4 °C) (!) 100.9 °F (38.3 °C) 99.1 °F (37.3 °C)   TempSrc: Oral Oral Oral Oral   SpO2: 94% 96% 92% 93%   Weight:       Height:          @obaegcx2svwfkrs@       Intake/Output Summary (Last 24 hours) at 2024 0809  Last data filed at 2024 0346  Gross per 24 hour   Intake 150 ml   Output --   Net 150 ml        Physical Exam:    Gen:  Obese, in no acute distress  HEENT:  Pink conjunctivae,  PERRL, hearing intact to voice, moist mucous membranes  Neck:  Supple, without masses, thyroid non-tender  Resp:  No accessory muscle use, clear breath sounds without wheezes rales or rhonchi  Card:  No murmurs, normal S1, S2 without thrills, bruits or peripheral edema  Abd:  Soft, non-tender, non-distended, normoactive bowel sounds are present, no mass  Lymph:  No cervical or inguinal adenopathy  Musc:  No cyanosis or clubbing  Skin:  No rashes or ulcers, skin turgor is good  Neuro:  Cranial nerves are grossly intact, no focal motor weakness, follows commands   Psych:  Good insight, oriented to person, place and time, alert    Telemetry reviewed:   normal sinus rhythm  __________________________________________________________________  Medications Reviewed: (see below)  Medications:     Current Facility-Administered Medications   Medication Dose Route Frequency    cefTRIAXone (ROCEPHIN) 1,000 mg in sterile water 10 mL IV syringe  1,000 mg IntraVENous Q24H    metroNIDAZOLE (FLAGYL) 500 mg in 0.9% NaCl 100 mL IVPB premix  500 mg IntraVENous Q12H    sodium chloride flush 0.9 % injection 5-40 mL  5-40 mL IntraVENous 2 times per day    sodium chloride flush 0.9 % injection 5-40 mL  5-40 mL IntraVENous PRN    0.9 % sodium chloride infusion   IntraVENous PRN    enoxaparin (LOVENOX) injection 40 mg  40 mg SubCUTAneous Daily    ondansetron (ZOFRAN-ODT) disintegrating tablet 4 mg  4 mg Oral Q8H PRN    Or    ondansetron (ZOFRAN) injection 4 mg  4 mg IntraVENous Q6H PRN    polyethylene glycol (GLYCOLAX) packet 17 g  17 g Oral Daily PRN    acetaminophen (TYLENOL) tablet 650 mg  650 mg Oral Q6H PRN    dextrose 5 % and 0.45 % NaCl with KCl 40 mEq infusion   IntraVENous Continuous    buPROPion (WELLBUTRIN XL) extended release tablet 150 mg  150 mg Oral Daily    lactobacillus (CULTURELLE) capsule 1 capsule  1 capsule Oral Daily with breakfast    pantoprazole (PROTONIX) 40 mg in sodium chloride (PF) 0.9 % 10 mL injection  40 mg

## 2024-05-08 NOTE — DISCHARGE SUMMARY
Physician Discharge Summary     Patient ID:  Maria Dolores Brunson  [id number]  83 y.o.  1940    Admit date: 5/7/2024    Discharge date of service and time: 5/9/2024  Greater than 30 minutes were spent providing discharge related services for this patient    Admission Diagnoses: Lactic acidosis [E87.20]  Gastroenteritis [K52.9]    Discharge Diagnoses:    Principal Diagnosis   Gastroenteritis                                             Hospital Course and other diagnoses  Gastroenteritis / Abdominal pain / Fever / Leukocytosis / Lactic acidosis / Nausea vomiting and diarrhea / Gastroesophageal reflux disease / Hiatal hernia / Hx Diverticulosis / Hx of small bowel obstruction - POA, CT shows only \"mild infectious/inflammatory enterocolitis.\"  US of gallbladder normal.  Symptoms likely infectious, likely viral, but continue ceftriaxone and flagyl for now.  Not septic,, though fever 100.8 once last night. Hydrate with IVF.  Clear diet.  Probiotics. PPI.  Procalcitonin 0.36.  Negative enteric panel.  Consulted GI and they cleared her to go home without antibiotics.     Anxiety and Depression - Continue fluoxetine and bupropion     Obese / JUAN on CPAP - Advise weight loss. No CPAP while acute vomiting risk.      Hypertension - POA, BP low, hold Norvasc for now     High cholesterol - Hold statin until eating     PCP: Noemi Canseco MD    Consults: GI    Significant Diagnostic Studies: See Hospital Course    Discharged home in improved condition.    Discharge Exam:  BP: 114/60   Pulse: 68   Resp: 17   Temp: 99.1 °F (37.3 °C)   TempSrc: Oral   SpO2: 93%   Weight:  75 kg   Height:  155 cm         Gen:  Obese, in no acute distress  HEENT:  Pink conjunctivae, PERRL, hearing intact to voice, moist mucous membranes  Neck:  Supple, without masses, thyroid non-tender  Resp:  No accessory muscle use, clear breath sounds without wheezes rales or rhonchi  Card:  No murmurs, normal S1, S2 without thrills, bruits or peripheral

## 2024-05-09 VITALS
HEIGHT: 61 IN | WEIGHT: 165.6 LBS | HEART RATE: 65 BPM | SYSTOLIC BLOOD PRESSURE: 142 MMHG | OXYGEN SATURATION: 97 % | TEMPERATURE: 97.7 F | RESPIRATION RATE: 16 BRPM | DIASTOLIC BLOOD PRESSURE: 72 MMHG | BODY MASS INDEX: 31.26 KG/M2

## 2024-05-09 PROCEDURE — 6360000002 HC RX W HCPCS: Performed by: INTERNAL MEDICINE

## 2024-05-09 PROCEDURE — 6370000000 HC RX 637 (ALT 250 FOR IP): Performed by: INTERNAL MEDICINE

## 2024-05-09 PROCEDURE — 94761 N-INVAS EAR/PLS OXIMETRY MLT: CPT

## 2024-05-09 RX ORDER — AMLODIPINE BESYLATE 5 MG/1
5 TABLET ORAL DAILY
Status: DISCONTINUED | OUTPATIENT
Start: 2024-05-09 | End: 2024-05-09 | Stop reason: HOSPADM

## 2024-05-09 RX ADMIN — AMOXICILLIN AND CLAVULANATE POTASSIUM 1 TABLET: 500; 125 TABLET, FILM COATED ORAL at 06:08

## 2024-05-09 RX ADMIN — FLUOXETINE HYDROCHLORIDE 5.2 MG: 20 LIQUID ORAL at 09:51

## 2024-05-09 RX ADMIN — BUPROPION HYDROCHLORIDE 150 MG: 150 TABLET, EXTENDED RELEASE ORAL at 09:49

## 2024-05-09 RX ADMIN — ENOXAPARIN SODIUM 40 MG: 100 INJECTION SUBCUTANEOUS at 09:48

## 2024-05-09 RX ADMIN — PANTOPRAZOLE SODIUM 40 MG: 40 TABLET, DELAYED RELEASE ORAL at 06:07

## 2024-05-09 RX ADMIN — AMLODIPINE BESYLATE 5 MG: 5 TABLET ORAL at 11:33

## 2024-05-09 RX ADMIN — Medication 1 CAPSULE: at 09:49

## 2024-05-09 NOTE — PROGRESS NOTES
[facility]    Hospitalist Progress Note    NAME: Maria Dolores Brunson   :  1940  MRM:  [id number]    Date/Time of service 2024  9:05 AM    To assist coordination of care and communication with nursing and staff, this note may be preliminary early in the day, but finalized by end of the day.        Assessment and Plan:     Gastroenteritis / Abdominal pain / Fever / Leukocytosis / Lactic acidosis / Nausea vomiting and diarrhea / Gastroesophageal reflux disease / Hiatal hernia / Hx Diverticulosis / Hx of small bowel obstruction - POA, CT shows only \"mild infectious/inflammatory enterocolitis.\"  US of gallbladder normal.  Symptoms likely infectious, likely viral, but continue ceftriaxone and flagyl for now.  Not septic, though fever every day still. Hydrate with IVF.  Clear diet.  Probiotics. PPI.  Procalcitonin 0.36.  Negative enteric panel. Consulted GI at request of patient     Anxiety and Depression - Continue fluoxetine and bupropion     Obese / JUAN on CPAP - Advise weight loss. No CPAP while acute vomiting risk.      Hypertension - POA, BP low, hold Norvasc for now     High cholesterol - Hold statin until eating       Subjective:     Chief Complaint:  nausea resolved. Feels well, no fever yet today    ROS:  (bold if positive, if negative)    Tolerating PT    Tolerating  Diet        Objective:     Last 24hrs VS reviewed since prior progress note. Most recent are:    Vitals:    24 1931 24 2314 24 0335 24 0729   BP: 128/69 122/69 (!) 147/68 (!) 152/74   Pulse: 61 61 60 59   Resp: 16 16 18    Temp: 98.2 °F (36.8 °C) 98.2 °F (36.8 °C) 97.7 °F (36.5 °C) 98.2 °F (36.8 °C)   TempSrc: Oral Oral Oral    SpO2: 96% 94% 97% 95%   Weight:       Height:          @bwmkzjc2fpyizpp@     No intake or output data in the 24 hours ending 24 0905       Physical Exam:    Gen:  Obese, in no acute distress  HEENT:  Pink conjunctivae, PERRL, hearing intact to voice, moist mucous        Recent Labs     05/07/24  0211 05/07/24  0218 05/07/24  0406 05/07/24  1149 05/08/24  0350     --   --   --  136   K 3.9  --   --   --  4.4     --   --   --  108   CO2 25  --   --   --  25   GLUCOSE 183*  --   --   --  117*   BUN 25*  --   --   --  12   CREATININE 1.13* 0.7 0.7  --  1.04*   CALCIUM 8.8  --   --   --  7.9*   MG  --   --   --  2.0 1.9   PHOS  --   --   --  2.9  --    AST 19  --   --   --  28   ALT 31  --   --   --  22       Lab Results   Component Value Date/Time    GLUCOSE 117 05/08/2024 03:50 AM    GLUCOSE 183 05/07/2024 02:11 AM    GLUCOSE 153 09/12/2023 07:08 PM    GLUCOSE 97 06/12/2021 10:45 PM    GLUCOSE 102 10/27/2020 09:38 AM     No results for input(s): \"PH\", \"PCO2\", \"PO2\", \"HCO3\", \"FIO2\" in the last 72 hours.  No results for input(s): \"INR\" in the last 72 hours.  Results       Procedure Component Value Units Date/Time    Culture, Blood 1 [[id number]] Collected: 05/08/24 1615    Order Status: Completed Specimen: Blood Updated: 05/09/24 0712     Special Requests --        NO SPECIAL REQUESTS  ARM       Culture NO GROWTH AFTER 14 HOURS       Culture, Blood 2 [[id number]] Collected: 05/08/24 1615    Order Status: Completed Specimen: Blood Updated: 05/09/24 0712     Special Requests NO SPECIAL REQUESTS        Culture NO GROWTH AFTER 14 HOURS       Clostridium Difficile Toxin/Antigen [[id number]] Collected: 05/07/24 1130    Order Status: Completed Specimen: Stool Updated: 05/07/24 1706     GDH Antigen Negative        C difficile Toxin, EIA Negative        C Diff Toxin Interpretation       NEGATIVE FOR TOXIGENIC C. DIFFICILE          Enteric Bact Panel, DNA [[id number]] Collected: 05/07/24 0730    Order Status: Completed Specimen: Stool Updated: 05/08/24 0046     SHIGELLA SPECIES, DNA Negative        CAMPYLOBACTER SPECIES, DNA Negative        VIBRIO SPECIES, DNA Negative        ENTEROTOXIGEN E COLI, DNA Negative        SHIGA TOXIN PRODUCING, DNA Negative        SALMONELLA SPECIES,

## 2024-05-09 NOTE — PLAN OF CARE
Problem: Discharge Planning  Goal: Discharge to home or other facility with appropriate resources  5/9/2024 1249 by Phuong Carney LPN  Outcome: Progressing  5/9/2024 1249 by Phuong Carney LPN  Reactivdean     Problem: Safety - Adult  Goal: Free from fall injury  5/9/2024 1249 by Phuong Carney LPN  Outcome: Progressing  5/9/2024 1249 by Rommel, [place], LPN  Reactivated

## 2024-05-09 NOTE — CARE COORDINATION
Care Management Progress Note          Reason for Admission:   Lactic acidosis [E87.20]  Gastroenteritis [K52.9]         Patient Admission Status: Inpatient  RUR: Readmission Risk Score: 8.4        Hospitalization in the last 30 days (Readmission):  No          Transition of care plan:  Per IDR, patient had another fever. GI has been consulted. Discharge pending medical stability and GI clearance. Patient was a pass on the egress test.     Dispo: home. No CM needs anticipated, please consult CM if needs arise.    Outpatient follow-up.    Pt's family to transport.                ___________________________________________   Jacey Talamantes RN Case Manager  5/9/2024   12:01 PM

## 2024-05-09 NOTE — PROGRESS NOTES
Spiritual Care Partner Volunteer visited patient at [facility] in [facility] on 5/9/2024    Documented by:  Chaplain Belkis Márquez MS, MDiv, [facility]  Spiritual Health Services  Paging service: [phone] (DEYA)

## 2024-05-09 NOTE — CONSULTS
Guerda Diana PA-C                       [phone] office             Monday-Friday 8:00 am-4:30 pm  I am not permitted to use \"perfect serve\" use above for contact, thanks.      Gastroenterology Consultation Note      Admit Date: 5/7/2024  Consult Date: 5/9/2024   I greatly appreciate your asking me to see Maria Dolores Brunson, thank you very much for the opportunity to participate in her care.    Narrative Assessment and Plan   82 YO F admitted with mild enterocolitis with symptoms of nausea, vomiting, and diarrhea. Stool studies have been negative. She is feeling much better and is confident about her bowel regimen at home. She has some questions on how to better manage her acid reflux. Denies abd pain, N/V/D today.     Impression:  Enterocolitis, likely viral   Acid reflux  Hepatic steatosis - known diagnosis, stable     Plan:   Recommend the patient continue with her home bowel regimen following discharge - Miralax BID, Senna PRN.   Recommend the patient try taking Pepcid 20mg as needed, 30 minutes before meals she knows to cause acid reflux, limit her intake of other foods which may contribute to reflux such as caffeine and chocolate, if she finds that these exacerbate her symptoms. She should also elevate the head of her bed slightly and wait about 2 hours from last food eaten before lying down in bed. If these measures do not work, I would recommend daily omeprazole 40 mg each morning before breakfast.  OP follow up with LEDY Soto. I will have our office contact the patient to arrange this.   No need for antibiotics at discharge.     Guerda Diana PA-C    Subjective:     Chief Complaint: Altered Bowel Habits, Nausea, and Vomiting    History of Present Illness: Ms. Brunson is a pleasant 83-year-old female well known to our practice who presented to the ED on 5/7/24 after having an explosive liquid bowel movement and an    09/12/23 94%        No intake or output data in the 24 hours ending 05/09/24 1314   General: no distress, comfortable  Skin:  No rash or palpable dermatologic mass lesions  HEENT: Pupils equal, sclera anicteric, oropharynx with no gross lesions  Cardiovascular: No abnormal audible heart sounds, well perfused, no edema  Respiratory:  No abnormal audible breath sounds, normal respiratory effort, no throacic deformity  GI: Abdomen nondistended, nontender, no mass, no free fluid, no rebound or guarding.  Musculoskeletal:  No skeletal deformity nor acute arthritis noted.  Neurological:  Motor and sensory function intact in upper extremeties  Psychiatric:  Normal affect, memory intact, appears to have insight into current illness    Laboratory:    Recent Results (from the past 24 hour(s))   Culture, Blood 1    Collection Time: 05/08/24  4:15 PM    Specimen: Blood   Result Value Ref Range    Special Requests NO SPECIAL REQUESTS  ARM        Culture NO GROWTH AFTER 14 HOURS     Culture, Blood 2    Collection Time: 05/08/24  4:15 PM    Specimen: Blood   Result Value Ref Range    Special Requests NO SPECIAL REQUESTS      Culture NO GROWTH AFTER 14 HOURS           Assessment/Plan:     Principal Problem:    Gastroenteritis  Active Problems:    High cholesterol    Depression    Nausea vomiting and diarrhea    Diverticulosis    GERD (gastroesophageal reflux disease)    Hiatal hernia    Hx of small bowel obstruction    Hypertension    Obese    JUAN on CPAP    Leukocytosis    Abdominal pain  Resolved Problems:    * No resolved hospital problems. *       See above narrative for full detail.

## 2024-05-12 LAB
BACTERIA SPEC CULT: NORMAL
BACTERIA SPEC CULT: NORMAL
SERVICE CMNT-IMP: NORMAL
SERVICE CMNT-IMP: NORMAL

## 2024-10-31 ENCOUNTER — HOSPITAL ENCOUNTER (EMERGENCY)
Facility: HOSPITAL | Age: 84
Discharge: HOME | End: 2024-10-31
Attending: STUDENT IN AN ORGANIZED HEALTH CARE EDUCATION/TRAINING PROGRAM
Payer: MEDICARE

## 2024-10-31 ENCOUNTER — APPOINTMENT (OUTPATIENT)
Facility: HOSPITAL | Age: 84
End: 2024-10-31
Payer: MEDICARE

## 2024-10-31 VITALS
RESPIRATION RATE: 18 BRPM | TEMPERATURE: 97.5 F | BODY MASS INDEX: 29.27 KG/M2 | HEART RATE: 81 BPM | HEIGHT: 61 IN | WEIGHT: 155 LBS | OXYGEN SATURATION: 96 % | SYSTOLIC BLOOD PRESSURE: 161 MMHG | DIASTOLIC BLOOD PRESSURE: 88 MMHG

## 2024-10-31 DIAGNOSIS — S09.90XA INJURY OF HEAD, INITIAL ENCOUNTER: Primary | ICD-10-CM

## 2024-10-31 PROCEDURE — 70450 CT HEAD/BRAIN W/O DYE: CPT

## 2024-10-31 PROCEDURE — 99284 EMERGENCY DEPT VISIT MOD MDM: CPT

## 2024-10-31 ASSESSMENT — ENCOUNTER SYMPTOMS
VOMITING: 0
NAUSEA: 0
BACK PAIN: 0

## 2024-10-31 ASSESSMENT — PAIN DESCRIPTION - PAIN TYPE: TYPE: ACUTE PAIN

## 2024-10-31 ASSESSMENT — PAIN - FUNCTIONAL ASSESSMENT: PAIN_FUNCTIONAL_ASSESSMENT: ACTIVITIES ARE NOT PREVENTED

## 2024-10-31 ASSESSMENT — PAIN DESCRIPTION - ORIENTATION: ORIENTATION: RIGHT;LEFT

## 2024-10-31 ASSESSMENT — PAIN DESCRIPTION - LOCATION: LOCATION: HEAD

## 2024-10-31 ASSESSMENT — PAIN DESCRIPTION - FREQUENCY: FREQUENCY: CONTINUOUS

## 2024-10-31 ASSESSMENT — PAIN SCALES - GENERAL: PAINLEVEL_OUTOF10: 5

## 2024-10-31 ASSESSMENT — PAIN DESCRIPTION - ONSET: ONSET: ON-GOING

## 2024-10-31 ASSESSMENT — PAIN DESCRIPTION - DESCRIPTORS: DESCRIPTORS: SORE

## 2024-10-31 NOTE — ED TRIAGE NOTES
Pt presents after a fall around 1645 today. Pt was going up steps and missed a step and fell forward and hit her head on the door frame and railing. Pt denies LOC. Denies blood thinners. Denies vision changes. Denies nausea. States her head \"just feels funny\".

## 2024-10-31 NOTE — ED PROVIDER NOTES
[facility] EMERGENCY DEPT  EMERGENCY DEPARTMENT ENCOUNTER      Pt Name: Maria Dolores Brunson  MRN: [id number]  Birthdate 1940  Date of evaluation: 10/31/2024  Provider: Donna Willson DO    CHIEF COMPLAINT       Chief Complaint   Patient presents with    Fall         HISTORY OF PRESENT ILLNESS    HPI    Maria Dolores Brunson is a 84 y.o. female who presents to the emergency department for evaluation of a fall.  Patient reports she was walking up her steps when she missed stepped and fell striking her head on the railing in a door.  No LOC.  Notes pain at the site of where she struck her head.  No neck pain.  No vision changes, weakness, paresthesias, nausea or vomiting.    Nursing Notes were reviewed.    REVIEW OF SYSTEMS       Review of Systems   Constitutional:  Negative for fever.   Eyes:  Negative for visual disturbance.   Gastrointestinal:  Negative for nausea and vomiting.   Musculoskeletal:  Negative for back pain and neck pain.   Neurological:  Positive for headaches. Negative for dizziness, speech difficulty, weakness and numbness.   Psychiatric/Behavioral:  Negative for agitation.            PAST MEDICAL HISTORY     Past Medical History:   Diagnosis Date    Depression     Diverticulosis     GERD (gastroesophageal reflux disease)     Hiatal hernia     High cholesterol     Hx of small bowel obstruction     Hypertension     Obese     JUAN on CPAP          SURGICAL HISTORY       Past Surgical History:   Procedure Laterality Date    CATARACT REMOVAL      bilateral cataract extraction    COLONOSCOPY  12/18/2014    ORTHOPEDIC SURGERY      right ankle surgery    ORTHOPEDIC SURGERY      left arm surgery    [place] UNLISTED PROCEDURE ABDOMEN PERITONEUM & OMENTUM      exploratory surgery    DAVID AND BSO (CERVIX REMOVED)      TONSILLECTOMY      UPPER GASTROINTESTINAL ENDOSCOPY  12/18/2014         CURRENT MEDICATIONS       Previous Medications    ACETAMINOPHEN (TYLENOL) 500 MG TABLET    Take 2 tablets by mouth every 6 hours